# Patient Record
Sex: MALE | Race: WHITE | NOT HISPANIC OR LATINO | Employment: FULL TIME | URBAN - METROPOLITAN AREA
[De-identification: names, ages, dates, MRNs, and addresses within clinical notes are randomized per-mention and may not be internally consistent; named-entity substitution may affect disease eponyms.]

---

## 2019-06-09 ENCOUNTER — OFFICE VISIT (OUTPATIENT)
Dept: RETAIL CLINIC | Facility: CLINIC | Age: 54
End: 2019-06-09

## 2019-06-09 VITALS
BODY MASS INDEX: 22.88 KG/M2 | SYSTOLIC BLOOD PRESSURE: 126 MMHG | TEMPERATURE: 97.9 F | WEIGHT: 134 LBS | DIASTOLIC BLOOD PRESSURE: 78 MMHG | OXYGEN SATURATION: 98 % | HEART RATE: 78 BPM | HEIGHT: 64 IN | RESPIRATION RATE: 18 BRPM

## 2019-06-09 DIAGNOSIS — J44.1 COPD WITH ACUTE EXACERBATION (HCC): Primary | ICD-10-CM

## 2019-06-09 DIAGNOSIS — R05.9 COUGHING: ICD-10-CM

## 2019-06-09 DIAGNOSIS — R06.2 WHEEZING ON EXHALATION: ICD-10-CM

## 2019-06-09 PROCEDURE — 94640 AIRWAY INHALATION TREATMENT: CPT | Performed by: NURSE PRACTITIONER

## 2019-06-09 PROCEDURE — 99213 OFFICE O/P EST LOW 20 MIN: CPT | Performed by: NURSE PRACTITIONER

## 2019-06-09 RX ORDER — PREDNISONE 10 MG/1
TABLET ORAL
Qty: 19 TABLET | Refills: 0 | Status: SHIPPED | OUTPATIENT
Start: 2019-06-09 | End: 2021-04-06

## 2019-06-09 RX ORDER — ALBUTEROL SULFATE 90 UG/1
2 AEROSOL, METERED RESPIRATORY (INHALATION) EVERY 4 HOURS PRN
COMMUNITY
End: 2019-06-09

## 2019-06-09 RX ORDER — AZITHROMYCIN 250 MG/1
TABLET, FILM COATED ORAL
Qty: 6 TABLET | Refills: 0 | Status: SHIPPED | OUTPATIENT
Start: 2019-06-09 | End: 2021-04-06

## 2019-06-09 RX ORDER — BENZONATATE 100 MG/1
100 CAPSULE ORAL 3 TIMES DAILY PRN
Qty: 30 CAPSULE | Refills: 0 | Status: SHIPPED | OUTPATIENT
Start: 2019-06-09 | End: 2019-06-19

## 2019-06-09 RX ORDER — BUDESONIDE AND FORMOTEROL FUMARATE DIHYDRATE 160; 4.5 UG/1; UG/1
2 AEROSOL RESPIRATORY (INHALATION)
Qty: 1 INHALER | Refills: 1 | Status: SHIPPED | OUTPATIENT
Start: 2019-06-09 | End: 2021-04-06

## 2019-06-09 RX ORDER — ALBUTEROL SULFATE 0.63 MG/3ML
1 SOLUTION RESPIRATORY (INHALATION) EVERY 6 HOURS PRN
Qty: 30 AMPULE | Refills: 12 | Status: SHIPPED | OUTPATIENT
Start: 2019-06-09 | End: 2021-11-23

## 2019-06-09 RX ORDER — IPRATROPIUM BROMIDE AND ALBUTEROL SULFATE 2.5; .5 MG/3ML; MG/3ML
3 SOLUTION RESPIRATORY (INHALATION) AS NEEDED
Status: COMPLETED | OUTPATIENT
Start: 2019-06-09 | End: 2019-06-09

## 2019-06-09 RX ADMIN — IPRATROPIUM BROMIDE AND ALBUTEROL SULFATE 3 ML: 2.5; .5 SOLUTION RESPIRATORY (INHALATION) at 14:04

## 2019-06-09 NOTE — PROGRESS NOTES
"Subjective   Ace Lane is a 54 y.o. male.     /78 (BP Location: Left arm, Patient Position: Sitting, Cuff Size: Adult)   Pulse 78   Temp 97.9 °F (36.6 °C) (Oral)   Resp 18   Ht 162.6 cm (64\")   Wt 60.8 kg (134 lb)   SpO2 98%   BMI 23.00 kg/m²       Cough   This is a new problem. The current episode started 1 to 4 weeks ago. The problem has been gradually worsening. The problem occurs constantly. The cough is productive of purulent sputum. Associated symptoms include chills, nasal congestion, postnasal drip, shortness of breath and wheezing. Nothing aggravates the symptoms. He has tried a beta-agonist inhaler and OTC cough suppressant for the symptoms. The treatment provided no relief. His past medical history is significant for COPD, environmental allergies and pneumonia. hx of PNA around 4 years, pt goes to [facility]         The following portions of the patient's history were reviewed and updated as appropriate: current medications, past family history, past medical history, past social history, past surgical history and problem list.    Review of Systems   Constitutional: Positive for chills and fatigue.   HENT: Positive for postnasal drip.    Eyes: Negative.    Respiratory: Positive for cough, shortness of breath and wheezing.    Gastrointestinal: Negative.    Genitourinary: Negative.    Musculoskeletal: Negative.    Skin: Negative.    Allergic/Immunologic: Positive for environmental allergies.   Hematological: Negative.    Psychiatric/Behavioral: Negative.        Objective   Physical Exam   Constitutional: He is oriented to person, place, and time. He appears well-developed and well-nourished.   HENT:   Head: Normocephalic and atraumatic.   Right Ear: External ear normal.   Left Ear: External ear normal.   Nose: Sinus tenderness and congestion present.   Mouth/Throat: Uvula is midline. Posterior oropharyngeal erythema present.   Eyes: Conjunctivae and EOM are normal. Pupils are equal, " round, and reactive to light.   Neck: Normal range of motion.   Cardiovascular: Normal rate, regular rhythm and normal heart sounds.   Pulmonary/Chest: Effort normal. He has wheezes in the right upper field, the right middle field, the right lower field, the left upper field, the left middle field and the left lower field.   Abdominal: Soft. Bowel sounds are normal.   Musculoskeletal: Normal range of motion.   Neurological: He is alert and oriented to person, place, and time.   Skin: Skin is warm and dry. Capillary refill takes less than 2 seconds.   Psychiatric: He has a normal mood and affect.   Vitals reviewed.        Assessment/Plan   Ace was seen today for cough.    Diagnoses and all orders for this visit:    COPD with acute exacerbation (CMS/[facility])  -     albuterol (PROAIR RESPICLICK) 108 (90 Base) MCG/ACT inhaler; Inhale 2 puffs Every 4 (Four) Hours As Needed for Wheezing.  -     budesonide-formoterol (SYMBICORT) 160-4.5 MCG/ACT inhaler; Inhale 2 puffs 2 (Two) Times a Day.  -     azithromycin (ZITHROMAX) 250 MG tablet; Take 2 tablets the first day, then 1 tablet daily for 4 days.    Wheezing on exhalation  -     Nebulizer Treatment  -     ipratropium-albuterol (DUO-NEB) nebulizer solution 3 mL  -     predniSONE (DELTASONE) 10 MG tablet; Take 4 tabs PO x 2 days, then take 3 tabs PO x 2 days, 2 tabs PO x 2 day, then take 1 tab PO x 1 day    Coughing  -     benzonatate (TESSALON PERLES) 100 MG capsule; Take 1 capsule by mouth 3 (Three) Times a Day As Needed for Cough for up to 10 days.    Other orders  -     albuterol (ACCUNEB) 0.63 MG/3ML nebulizer solution; Take 3 mL by nebulization Every 6 (Six) Hours As Needed for Wheezing.          Chronic Obstructive Pulmonary Disease  Chronic obstructive pulmonary disease (COPD) is a long-term (chronic) lung problem. When you have COPD, it is hard for air to get in and out of your lungs. Usually the condition gets worse over time, and your lungs will never return to  normal. There are things you can do to keep yourself as healthy as possible.  · Your doctor may treat your condition with:  ? Medicines.  ? Oxygen.  ? Lung surgery.  · Your doctor may also recommend:  ? Rehabilitation. This includes steps to make your body work better. It may involve a team of specialists.  ? Quitting smoking, if you smoke.  ? Exercise and changes to your diet.  ? Comfort measures (palliative care).    Follow these instructions at home:  Medicines  · Take over-the-counter and prescription medicines only as told by your doctor.  · Talk to your doctor before taking any cough or allergy medicines. You may need to avoid medicines that cause your lungs to be dry.  Lifestyle  · If you smoke, stop. Smoking makes the problem worse. If you need help quitting, ask your doctor.  · Avoid being around things that make your breathing worse. This may include smoke, chemicals, and fumes.  · Stay active, but remember to rest as well.  · Learn and use tips on how to relax.  · Make sure you get enough sleep. Most adults need at least 7 hours of sleep every night.  · Eat healthy foods. Eat smaller meals more often. Rest before meals.  Controlled breathing  Learn and use tips on how to control your breathing as told by your doctor. Try:  · Breathing in (inhaling) through your nose for 1 second. Then, pucker your lips and breath out (exhale) through your lips for 2 seconds.  · Putting one hand on your belly (abdomen). Breathe in slowly through your nose for 1 second. Your hand on your belly should move out. Pucker your lips and breathe out slowly through your lips. Your hand on your belly should move in as you breathe out.    Controlled coughing  Learn and use controlled coughing to clear mucus from your lungs. Follow these steps:  1. Lean your head a little forward.  2. Breathe in deeply.  3. Try to hold your breath for 3 seconds.  4. Keep your mouth slightly open while coughing 2 times.  5. Spit any mucus out into a  tissue.  6. Rest and do the steps again 1 or 2 times as needed.    General instructions  · Make sure you get all the shots (vaccines) that your doctor recommends. Ask your doctor about a flu shot and a pneumonia shot.  · Use oxygen therapy and pulmonary rehabilitation if told by your doctor. If you need home oxygen therapy, ask your doctor if you should buy a tool to measure your oxygen level (oximeter).  · Make a COPD action plan with your doctor. This helps you to know what to do if you feel worse than usual.  · Manage any other conditions you have as told by your doctor.  · Avoid going outside when it is very hot, cold, or humid.  · Avoid people who have a sickness you can catch (contagious).  · Keep all follow-up visits as told by your doctor. This is important.  Contact a doctor if:  · You cough up more mucus than usual.  · There is a change in the color or thickness of the mucus.  · It is harder to breathe than usual.  · Your breathing is faster than usual.  · You have trouble sleeping.  · You need to use your medicines more often than usual.  · You have trouble doing your normal activities such as getting dressed or walking around the house.  Get help right away if:  · You have shortness of breath while resting.  · You have shortness of breath that stops you from:  ? Being able to talk.  ? Doing normal activities.  · Your chest hurts for longer than 5 minutes.  · Your skin color is more blue than usual.  · Your pulse oximeter shows that you have low oxygen for longer than 5 minutes.  · You have a fever.  · You feel too tired to breathe normally.  Summary  · Chronic obstructive pulmonary disease (COPD) is a long-term lung problem.  · The way your lungs work will never return to normal. Usually the condition gets worse over time. There are things you can do to keep yourself as healthy as possible.  · Take over-the-counter and prescription medicines only as told by your doctor.  · If you smoke, stop. Smoking  makes the problem worse.  This information is not intended to replace advice given to you by your health care provider. Make sure you discuss any questions you have with your health care provider.  Document Released: 06/05/2009 Document Revised: 01/22/2018 Document Reviewed: 01/22/2018  Elsevier Interactive Patient Education © 2019 [employer]

## 2021-04-06 ENCOUNTER — OFFICE VISIT (OUTPATIENT)
Dept: SURGERY | Facility: CLINIC | Age: 56
End: 2021-04-06

## 2021-04-06 ENCOUNTER — LAB (OUTPATIENT)
Dept: LAB | Facility: HOSPITAL | Age: 56
End: 2021-04-06

## 2021-04-06 VITALS
HEIGHT: 64 IN | BODY MASS INDEX: 23.9 KG/M2 | WEIGHT: 140 LBS | DIASTOLIC BLOOD PRESSURE: 54 MMHG | SYSTOLIC BLOOD PRESSURE: 120 MMHG

## 2021-04-06 DIAGNOSIS — R10.11 RIGHT UPPER QUADRANT ABDOMINAL PAIN: ICD-10-CM

## 2021-04-06 DIAGNOSIS — R10.11 RIGHT UPPER QUADRANT ABDOMINAL PAIN: Primary | ICD-10-CM

## 2021-04-06 LAB
ALBUMIN SERPL-MCNC: 4.1 G/DL (ref 3.5–5.2)
ALBUMIN/GLOB SERPL: 1.7 G/DL
ALP SERPL-CCNC: 52 U/L (ref 39–117)
ALT SERPL W P-5'-P-CCNC: 96 U/L (ref 1–41)
ANION GAP SERPL CALCULATED.3IONS-SCNC: 11.3 MMOL/L (ref 5–15)
AST SERPL-CCNC: 77 U/L (ref 1–40)
BILIRUB SERPL-MCNC: 0.5 MG/DL (ref 0–1.2)
BUN SERPL-MCNC: 5 MG/DL (ref 6–20)
BUN/CREAT SERPL: 6.9 (ref 7–25)
CALCIUM SPEC-SCNC: 9.4 MG/DL (ref 8.6–10.5)
CHLORIDE SERPL-SCNC: 97 MMOL/L (ref 98–107)
CO2 SERPL-SCNC: 26.7 MMOL/L (ref 22–29)
CREAT SERPL-MCNC: 0.72 MG/DL (ref 0.76–1.27)
DEPRECATED RDW RBC AUTO: 43.9 FL (ref 37–54)
ERYTHROCYTE [DISTWIDTH] IN BLOOD BY AUTOMATED COUNT: 12.6 % (ref 12.3–15.4)
GFR SERPL CREATININE-BSD FRML MDRD: 113 ML/MIN/1.73
GLOBULIN UR ELPH-MCNC: 2.4 GM/DL
GLUCOSE SERPL-MCNC: 97 MG/DL (ref 65–99)
HCT VFR BLD AUTO: 46 % (ref 37.5–51)
HGB BLD-MCNC: 15.9 G/DL (ref 13–17.7)
MCH RBC QN AUTO: 32.9 PG (ref 26.6–33)
MCHC RBC AUTO-ENTMCNC: 34.6 G/DL (ref 31.5–35.7)
MCV RBC AUTO: 95.2 FL (ref 79–97)
PLATELET # BLD AUTO: 186 10*3/MM3 (ref 140–450)
PMV BLD AUTO: 9.6 FL (ref 6–12)
POTASSIUM SERPL-SCNC: 4.3 MMOL/L (ref 3.5–5.2)
PROT SERPL-MCNC: 6.5 G/DL (ref 6–8.5)
RBC # BLD AUTO: 4.83 10*6/MM3 (ref 4.14–5.8)
SODIUM SERPL-SCNC: 135 MMOL/L (ref 136–145)
WBC # BLD AUTO: 6.33 10*3/MM3 (ref 3.4–10.8)

## 2021-04-06 PROCEDURE — 99203 OFFICE O/P NEW LOW 30 MIN: CPT | Performed by: SURGERY

## 2021-04-06 PROCEDURE — 80053 COMPREHEN METABOLIC PANEL: CPT

## 2021-04-06 PROCEDURE — 85027 COMPLETE CBC AUTOMATED: CPT

## 2021-04-06 PROCEDURE — 36415 COLL VENOUS BLD VENIPUNCTURE: CPT

## 2021-04-06 RX ORDER — CETIRIZINE HCL 10 MG/1
1 CAPSULE ORAL EVERY OTHER DAY
COMMUNITY
Start: 2014-12-17

## 2021-04-06 NOTE — PROGRESS NOTES
PATIENT INFORMATION  Ace Lane       - 1965    CHIEF COMPLAINT  Chief Complaint   Patient presents with   • Abdominal Pain   no testing done    HISTORY OF PRESENT ILLNESS  HPI complains of several week history of right upper quadrant pain after he eats anything.  This is associated with nausea and he says he vomits every morning.  He denies any jaundice type symptoms denies any fevers or chills.  He has not had any recent imaging.  He denies any postural type symptoms.  He has not had a recent colonoscopy.  He says his stool has been darker than normal but not black.  He did have a CAT scan in 2016 but no imaging since        REVIEW OF SYSTEMS  Review of Systems he is a smoker.  All other organ systems reviewed and are negative specifically denies any urinary tract complaints      ACTIVE PROBLEMS  There are no problems to display for this patient.        PAST MEDICAL HISTORY  Past Medical History:   Diagnosis Date   • COPD (chronic obstructive pulmonary disease) (CMS/HCC)    • Hypertension          SURGICAL HISTORY  Past Surgical History:   Procedure Laterality Date   • FACIAL COSMETIC SURGERY           FAMILY HISTORY  No family history on file.      SOCIAL HISTORY  Social History     Occupational History   • Not on file   Tobacco Use   • Smoking status: Current Every Day Smoker     Packs/day: 1.00     Types: Cigarettes   Substance and Sexual Activity   • Alcohol use: Yes     Comment: 2-3 cans per day   • Drug use: No   • Sexual activity: Not on file         CURRENT MEDICATIONS    Current Outpatient Medications:   •  albuterol (ACCUNEB) 0.63 MG/3ML nebulizer solution, Take 3 mL by nebulization Every 6 (Six) Hours As Needed for Wheezing., Disp: 30 ampule, Rfl: 12  •  albuterol (PROAIR RESPICLICK) 108 (90 Base) MCG/ACT inhaler, Inhale 2 puffs Every 4 (Four) Hours As Needed for Wheezing., Disp: 1 inhaler, Rfl: 1  •  Cetirizine HCl (Allergy Relief) 10 MG capsule, Take  by mouth., Disp: , Rfl:   •   "azithromycin (ZITHROMAX) 250 MG tablet, Take 2 tablets the first day, then 1 tablet daily for 4 days., Disp: 6 tablet, Rfl: 0  •  budesonide-formoterol (SYMBICORT) 160-4.5 MCG/ACT inhaler, Inhale 2 puffs 2 (Two) Times a Day., Disp: 1 inhaler, Rfl: 1  •  dicyclomine (BENTYL) 20 MG tablet, Take 1 tablet by mouth Every 8 (Eight) Hours As Needed (abdominal pain)., Disp: 20 tablet, Rfl: 0  •  predniSONE (DELTASONE) 10 MG tablet, Take 4 tabs PO x 2 days, then take 3 tabs PO x 2 days, 2 tabs PO x 2 day, then take 1 tab PO x 1 day, Disp: 19 tablet, Rfl: 0    ALLERGIES  Codeine    VITALS  Vitals:    04/06/21 1040   BP: 120/54   Weight: 63.5 kg (140 lb)   Height: 162.6 cm (64\")       LAST RESULTS   Admission on 12/28/2016, Discharged on 12/28/2016   Component Date Value Ref Range Status   • Glucose 12/28/2016 86  65 - 99 mg/dL Final   • BUN 12/28/2016 10  6 - 20 mg/dL Final   • Creatinine 12/28/2016 0.72* 0.76 - 1.27 mg/dL Final   • Sodium 12/28/2016 135* 136 - 145 mmol/L Final   • Potassium 12/28/2016 4.5  3.5 - 5.2 mmol/L Final   • Chloride 12/28/2016 97* 98 - 107 mmol/L Final   • CO2 12/28/2016 24.3  22.0 - 29.0 mmol/L Final   • Calcium 12/28/2016 9.1  8.6 - 10.5 mg/dL Final   • Total Protein 12/28/2016 6.8  6.0 - 8.5 g/dL Final   • Albumin 12/28/2016 4.10  3.50 - 5.20 g/dL Final   • ALT (SGPT) 12/28/2016 15  5 - 41 U/L Final   • AST (SGOT) 12/28/2016 19  5 - 40 U/L Final   • Alkaline Phosphatase 12/28/2016 62  40 - 129 U/L Final   • Total Bilirubin 12/28/2016 0.6  0.2 - 1.2 mg/dL Final   • eGFR Non African Amer 12/28/2016 116  >60 mL/min/1.73 Final   • Globulin 12/28/2016 2.7  gm/dL Final   • A/G Ratio 12/28/2016 1.5  g/dL Final   • BUN/Creatinine Ratio 12/28/2016 13.9  7.0 - 25.0 Final   • Anion Gap 12/28/2016 13.7  mmol/L Final   • Lipase 12/28/2016 23  13 - 60 U/L Final   • Color, UA 12/28/2016 Yellow  Yellow, Straw Final   • Appearance, UA 12/28/2016 Clear  Clear Final   • pH, UA 12/28/2016 7.0  4.5 - 8.0 Final   • " Specific Gravity, UA 12/28/2016 1.010  1.003 - 1.030 Final   • Glucose, UA 12/28/2016 Negative  Negative Final   • Ketones, UA 12/28/2016 Negative  Negative Final   • Bilirubin, UA 12/28/2016 Negative  Negative Final   • Blood, UA 12/28/2016 Negative  Negative Final   • Protein, UA 12/28/2016 Negative  Negative Final   • Leuk Esterase, UA 12/28/2016 Negative  Negative Final   • Nitrite, UA 12/28/2016 Negative  Negative Final   • Urobilinogen, UA 12/28/2016 0.2 E.U./dL  0.2 - 1.0 E.U./dL Final   • Extra Tube 12/28/2016 hold for add-on   Final    Auto resulted   • Extra Tube 12/28/2016 Hold for add-ons.   Final    Auto resulted.   • Extra Tube 12/28/2016 hold for add-on   Final    Auto resulted   • Extra Tube 12/28/2016 Hold for add-ons.   Final    Auto resulted.   • WBC 12/28/2016 7.57  4.80 - 10.80 10*3/mm3 Final   • RBC 12/28/2016 5.15  4.70 - 6.10 10*6/mm3 Final   • Hemoglobin 12/28/2016 16.1  14.0 - 18.0 g/dL Final   • Hematocrit 12/28/2016 47.5  42.0 - 52.0 % Final   • MCV 12/28/2016 92.2  80.0 - 94.0 fL Final   • MCH 12/28/2016 31.3* 27.0 - 31.0 pg Final   • MCHC 12/28/2016 33.9  31.0 - 37.0 g/dL Final   • RDW 12/28/2016 12.8  11.5 - 14.5 % Final   • RDW-SD 12/28/2016 43.6  37.0 - 54.0 fl Final   • MPV 12/28/2016 9.2  7.4 - 10.4 fL Final   • Platelets 12/28/2016 200  140 - 500 10*3/mm3 Final   • Neutrophil % 12/28/2016 55.8  45.0 - 70.0 % Final   • Lymphocyte % 12/28/2016 31.4  20.0 - 45.0 % Final   • Monocyte % 12/28/2016 7.0  3.0 - 8.0 % Final   • Eosinophil % 12/28/2016 4.5* 0.0 - 4.0 % Final   • Basophil % 12/28/2016 0.8  0.0 - 2.0 % Final   • Immature Grans % 12/28/2016 0.5  0.0 - 0.5 % Final   • Neutrophils, Absolute 12/28/2016 4.22  1.50 - 8.30 10*3/mm3 Final   • Lymphocytes, Absolute 12/28/2016 2.38  0.60 - 4.80 10*3/mm3 Final   • Monocytes, Absolute 12/28/2016 0.53  0.00 - 1.00 10*3/mm3 Final   • Eosinophils, Absolute 12/28/2016 0.34* 0.10 - 0.30 10*3/mm3 Final   • Basophils, Absolute 12/28/2016 0.06   0.00 - 0.20 10*3/mm3 Final   • Immature Grans, Absolute 12/28/2016 0.04* 0.00 - 0.03 10*3/mm3 Final   • nRBC 12/28/2016 0.0  0.0 - 0.0 /100 WBC Final     No results found.    PHYSICAL EXAM  Debilities/Disabilities Identified: None  Emotional Behavior: Appropriate  Physical Exam alert white male in no active distress.  He does not have any clinical jaundice.  His lungs are clear and equal.  His abdomen NABS soft nontender without mass.  CT scan from 2016 showed a distended gallbladder but no stones.  ASSESSMENT  Right upper quadrant abdominal pain      PLAN  I will see him back after an ultrasound of his gallbladder, CBC, CMP are performed

## 2021-04-14 ENCOUNTER — HOSPITAL ENCOUNTER (OUTPATIENT)
Dept: ULTRASOUND IMAGING | Facility: HOSPITAL | Age: 56
Discharge: HOME | End: 2021-04-14
Admitting: SURGERY

## 2021-04-14 DIAGNOSIS — R10.11 RIGHT UPPER QUADRANT ABDOMINAL PAIN: Primary | ICD-10-CM

## 2021-04-14 DIAGNOSIS — R10.11 RIGHT UPPER QUADRANT ABDOMINAL PAIN: ICD-10-CM

## 2021-04-14 PROCEDURE — 76705 ECHO EXAM OF ABDOMEN: CPT

## 2021-04-15 ENCOUNTER — TELEPHONE (OUTPATIENT)
Dept: GASTROENTEROLOGY | Facility: CLINIC | Age: 56
End: 2021-04-15

## 2021-04-15 NOTE — TELEPHONE ENCOUNTER
Patient called today wanting to know if the provider wanted to see him before or after the scan he is getting on Monday 04/19/2012     NM HIDA Scan With Pharmacological Intervention is the appt .     Patients wife is the contact phone number     Kamla Ariana (Spouse)       [phone]

## 2021-04-19 ENCOUNTER — DOCUMENTATION (OUTPATIENT)
Dept: SURGERY | Facility: CLINIC | Age: 56
End: 2021-04-19

## 2021-04-19 ENCOUNTER — HOSPITAL ENCOUNTER (OUTPATIENT)
Dept: NUCLEAR MEDICINE | Facility: HOSPITAL | Age: 56
Discharge: HOME | End: 2021-04-19

## 2021-04-19 DIAGNOSIS — R10.11 RIGHT UPPER QUADRANT ABDOMINAL PAIN: ICD-10-CM

## 2021-04-19 PROCEDURE — A9537 TC99M MEBROFENIN: HCPCS | Performed by: SURGERY

## 2021-04-19 PROCEDURE — 0 TECHNETIUM TC 99M MEBROFENIN KIT: Performed by: SURGERY

## 2021-04-19 PROCEDURE — 78226 HEPATOBILIARY SYSTEM IMAGING: CPT

## 2021-04-19 RX ORDER — KIT FOR THE PREPARATION OF TECHNETIUM TC 99M MEBROFENIN 45 MG/10ML
1 INJECTION, POWDER, LYOPHILIZED, FOR SOLUTION INTRAVENOUS
Status: COMPLETED | OUTPATIENT
Start: 2021-04-19 | End: 2021-04-19

## 2021-04-19 RX ADMIN — MEBROFENIN 1 DOSE: 45 INJECTION, POWDER, LYOPHILIZED, FOR SOLUTION INTRAVENOUS at 13:04

## 2021-04-19 NOTE — PROGRESS NOTES
Ultrasound and fatty food stimulation HIDA scan are both normal.  My staff will call the patient and we would recommend he see gastroenterology for an EGD

## 2021-04-19 NOTE — PROGRESS NOTES
Patient aware of results. He is going to let his wife schedule his appointment with a gastroenterologist.

## 2021-05-19 ENCOUNTER — OFFICE VISIT (OUTPATIENT)
Dept: GASTROENTEROLOGY | Facility: CLINIC | Age: 56
End: 2021-05-19

## 2021-05-19 ENCOUNTER — PREP FOR SURGERY (OUTPATIENT)
Dept: SURGERY | Facility: SURGERY CENTER | Age: 56
End: 2021-05-19

## 2021-05-19 ENCOUNTER — TRANSCRIBE ORDERS (OUTPATIENT)
Dept: LAB | Facility: SURGERY CENTER | Age: 56
End: 2021-05-19

## 2021-05-19 VITALS
WEIGHT: 134.5 LBS | BODY MASS INDEX: 22.96 KG/M2 | DIASTOLIC BLOOD PRESSURE: 84 MMHG | SYSTOLIC BLOOD PRESSURE: 138 MMHG | TEMPERATURE: 98.4 F | OXYGEN SATURATION: 98 % | HEIGHT: 64 IN | HEART RATE: 64 BPM | RESPIRATION RATE: 16 BRPM

## 2021-05-19 DIAGNOSIS — R10.11 RIGHT UPPER QUADRANT ABDOMINAL PAIN: Primary | ICD-10-CM

## 2021-05-19 DIAGNOSIS — R74.8 ELEVATED LIVER ENZYMES: ICD-10-CM

## 2021-05-19 DIAGNOSIS — R11.0 NAUSEA: ICD-10-CM

## 2021-05-19 DIAGNOSIS — R63.4 WEIGHT LOSS: ICD-10-CM

## 2021-05-19 DIAGNOSIS — Z01.818 OTHER SPECIFIED PRE-OPERATIVE EXAMINATION: Primary | ICD-10-CM

## 2021-05-19 DIAGNOSIS — Z12.11 COLON CANCER SCREENING: ICD-10-CM

## 2021-05-19 PROCEDURE — 99204 OFFICE O/P NEW MOD 45 MIN: CPT | Performed by: INTERNAL MEDICINE

## 2021-05-19 RX ORDER — SODIUM CHLORIDE 0.9 % (FLUSH) 0.9 %
3 SYRINGE (ML) INJECTION EVERY 12 HOURS SCHEDULED
Status: CANCELLED | OUTPATIENT
Start: 2021-05-19

## 2021-05-19 RX ORDER — SODIUM CHLORIDE 0.9 % (FLUSH) 0.9 %
10 SYRINGE (ML) INJECTION AS NEEDED
Status: CANCELLED | OUTPATIENT
Start: 2021-05-19

## 2021-05-19 RX ORDER — SODIUM CHLORIDE, SODIUM LACTATE, POTASSIUM CHLORIDE, CALCIUM CHLORIDE 600; 310; 30; 20 MG/100ML; MG/100ML; MG/100ML; MG/100ML
30 INJECTION, SOLUTION INTRAVENOUS CONTINUOUS PRN
Status: CANCELLED | OUTPATIENT
Start: 2021-05-19

## 2021-05-19 NOTE — PROGRESS NOTES
"Chief Complaint   Patient presents with   • Abdominal Pain   • Elevated Hepatic Enzymes   • Colon Cancer Screening         History of Present Illness  Patient here for nausea, weight loss and right upper quadrant pain. He reports epigastric pain that he describes as a \"hot poker\" after eating. Pain does not radiate.  If he does not eat, he will not have any pain. Gallbladder work up was normal. He reports accompanying nausea with the abdominal pain. He denies any history of pancreatitis. He reports a history of heavy alcohol use, but not recently. He reports a 16 lb weight loss due to lack of eating. He is sam if he eats once per day.     He reports having a colonoscopy many years ago.        Result Review :       NM Hepatobiliary Without CCK (04/19/2021 15:10)  US Gallbladder (04/14/2021 10:19)  CT Abdomen Pelvis With Contrast (12/28/2016 13:33)      Vital Signs:   /84   Pulse 64   Temp 98.4 °F (36.9 °C) (Temporal)   Resp 16   Ht 162.6 cm (64\")   Wt 61 kg (134 lb 8 oz)   SpO2 98%   BMI 23.09 kg/m²     Body mass index is 23.09 kg/m².     Physical Exam  Vitals reviewed.   Constitutional:       Appearance: Normal appearance.   Abdominal:      General: Bowel sounds are normal. There is no distension.      Palpations: Abdomen is soft. Abdomen is not rigid. There is no mass or pulsatile mass.      Tenderness: There is no abdominal tenderness. There is no guarding or rebound.       Assessment and Plan    Diagnoses and all orders for this visit:    1. Right upper quadrant abdominal pain (Primary)    2. Weight loss    3. Nausea    4. Elevated liver enzymes    5. Colon cancer screening    Other orders  -     CT Abdomen Pelvis With Contrast; Future  -     Alpha - 1 - Antitrypsin  -     ANTONIO  -     Anti-Smooth Muscle Antibody Titer  -     CBC & Differential  -     Comprehensive Metabolic Panel  -     Ferritin  -     Celiac Disease Panel  -     Ceruloplasmin  -     Gamma GT  -     Hepatitis Panel, Acute  -     " IgG, IgA, IgM  -     Iron Profile  -     Mitochondrial Antibodies, M2         BRIEF SUMMARY  Patient for consultation with multiple issues.  He has elevated liver enzymes which is new.  We will check a full serologic and metabolic panel to assess this.  Liver ultrasound and HIDA scan were negative.  He also has new epigastric pain after meals.  He has a long history of alcohol though none currently and also is a smoker and has new and significant weight loss so we will proceed with a CT to rule out any type of pancreatic issue.  Also proceed with an EGD for further evaluation of epigastric pain.  We also going to get his screening colonoscopy scheduled.    I have reviewed and confirmed the accuracy of the HPI and Assessment and Plan as documented by the APRN JOSH Ramirez        Follow Up   No follow-ups on file.    Patient Instructions   1. For further evaluation of epigastric pain and weight loss we will schedule an EGD.    2. For weight loss and colon cancer screening we will schedule a colonoscopy.    3. For further evaluation of abdominal pain we will schedule a CT scan of the abdomen and pelvis.    4. For further evaluation of elevated liver enzymes we will order a full liver lab work up.

## 2021-05-19 NOTE — PATIENT INSTRUCTIONS
1. For further evaluation of epigastric pain and weight loss we will schedule an EGD.    2. For weight loss and colon cancer screening we will schedule a colonoscopy.    3. For further evaluation of abdominal pain we will schedule a CT scan of the abdomen and pelvis.    4. For further evaluation of elevated liver enzymes we will order a full liver lab work up.

## 2021-05-20 LAB
A1AT SERPL-MCNC: 144 MG/DL (ref 101–187)
ACTIN IGG SERPL-ACNC: 4 UNITS (ref 0–19)
ALBUMIN SERPL-MCNC: 4.7 G/DL (ref 3.5–5.2)
ALBUMIN/GLOB SERPL: 2.8 G/DL
ALP SERPL-CCNC: 49 U/L (ref 39–117)
ALT SERPL-CCNC: 46 U/L (ref 1–41)
ANA SER QL: POSITIVE
AST SERPL-CCNC: 33 U/L (ref 1–40)
BASOPHILS # BLD AUTO: 0.06 10*3/MM3 (ref 0–0.2)
BASOPHILS NFR BLD AUTO: 0.9 % (ref 0–1.5)
BILIRUB SERPL-MCNC: 0.7 MG/DL (ref 0–1.2)
BUN SERPL-MCNC: 8 MG/DL (ref 6–20)
BUN/CREAT SERPL: 10.7 (ref 7–25)
CALCIUM SERPL-MCNC: 9.5 MG/DL (ref 8.6–10.5)
CERULOPLASMIN SERPL-MCNC: 17 MG/DL (ref 16–31)
CHLORIDE SERPL-SCNC: 98 MMOL/L (ref 98–107)
CO2 SERPL-SCNC: 25.8 MMOL/L (ref 22–29)
CREAT SERPL-MCNC: 0.75 MG/DL (ref 0.76–1.27)
DSDNA AB SER-ACNC: <1 IU/ML (ref 0–9)
ENDOMYSIUM IGA SER QL: NEGATIVE
EOSINOPHIL # BLD AUTO: 0.14 10*3/MM3 (ref 0–0.4)
EOSINOPHIL NFR BLD AUTO: 2.2 % (ref 0.3–6.2)
ERYTHROCYTE [DISTWIDTH] IN BLOOD BY AUTOMATED COUNT: 13 % (ref 12.3–15.4)
FERRITIN SERPL-MCNC: 419 NG/ML (ref 30–400)
GGT SERPL-CCNC: 45 U/L (ref 8–61)
GLOBULIN SER CALC-MCNC: 1.7 GM/DL
GLUCOSE SERPL-MCNC: 78 MG/DL (ref 65–99)
HAV IGM SERPL QL IA: NEGATIVE
HBV CORE IGM SERPL QL IA: NEGATIVE
HBV SURFACE AG SERPL QL IA: NEGATIVE
HCT VFR BLD AUTO: 46.7 % (ref 37.5–51)
HCV AB S/CO SERPL IA: <0.1 S/CO RATIO (ref 0–0.9)
HGB BLD-MCNC: 16 G/DL (ref 13–17.7)
IGA SERPL-MCNC: 180 MG/DL (ref 90–386)
IGG SERPL-MCNC: 837 MG/DL (ref 603–1613)
IGM SERPL-MCNC: 57 MG/DL (ref 20–172)
IMM GRANULOCYTES # BLD AUTO: 0.02 10*3/MM3 (ref 0–0.05)
IMM GRANULOCYTES NFR BLD AUTO: 0.3 % (ref 0–0.5)
IRON SATN MFR SERPL: 40 % (ref 20–50)
IRON SERPL-MCNC: 107 MCG/DL (ref 59–158)
LYMPHOCYTES # BLD AUTO: 1.65 10*3/MM3 (ref 0.7–3.1)
LYMPHOCYTES NFR BLD AUTO: 25.5 % (ref 19.6–45.3)
Lab: NORMAL
MCH RBC QN AUTO: 32.5 PG (ref 26.6–33)
MCHC RBC AUTO-ENTMCNC: 34.3 G/DL (ref 31.5–35.7)
MCV RBC AUTO: 94.9 FL (ref 79–97)
MITOCHONDRIA M2 IGG SER-ACNC: <20 UNITS (ref 0–20)
MONOCYTES # BLD AUTO: 0.65 10*3/MM3 (ref 0.1–0.9)
MONOCYTES NFR BLD AUTO: 10.1 % (ref 5–12)
NEUTROPHILS # BLD AUTO: 3.94 10*3/MM3 (ref 1.7–7)
NEUTROPHILS NFR BLD AUTO: 61 % (ref 42.7–76)
NRBC BLD AUTO-RTO: 0 /100 WBC (ref 0–0.2)
PLATELET # BLD AUTO: 215 10*3/MM3 (ref 140–450)
POTASSIUM SERPL-SCNC: 4.5 MMOL/L (ref 3.5–5.2)
PROT SERPL-MCNC: 6.4 G/DL (ref 6–8.5)
RBC # BLD AUTO: 4.92 10*6/MM3 (ref 4.14–5.8)
SODIUM SERPL-SCNC: 135 MMOL/L (ref 136–145)
TIBC SERPL-MCNC: 270 MCG/DL
TTG IGA SER-ACNC: <2 U/ML (ref 0–3)
UIBC SERPL-MCNC: 163 MCG/DL (ref 112–346)
WBC # BLD AUTO: 6.46 10*3/MM3 (ref 3.4–10.8)

## 2021-05-21 ENCOUNTER — LAB (OUTPATIENT)
Dept: LAB | Facility: SURGERY CENTER | Age: 56
End: 2021-05-21

## 2021-05-21 DIAGNOSIS — Z01.818 OTHER SPECIFIED PRE-OPERATIVE EXAMINATION: ICD-10-CM

## 2021-05-21 LAB — SARS-COV-2 ORF1AB RESP QL NAA+PROBE: NOT DETECTED

## 2021-05-21 PROCEDURE — C9803 HOPD COVID-19 SPEC COLLECT: HCPCS

## 2021-05-21 PROCEDURE — U0004 COV-19 TEST NON-CDC HGH THRU: HCPCS | Performed by: SURGERY

## 2021-05-22 NOTE — SIGNIFICANT NOTE
Education provided the Patient on the following:    - Nothing to Eat or Drink after MN the night before the procedure  -Your required COVID Test is Scheduled on          Between the Hours of 3038-6364  -You will only be notified if your COVID test Result is POSITIVE  -The importance of reducing your number of contacts by self quarantining after you COVID test until the date of your Colonoscopy and EGD    - Avoid red/purple fluids while completing their bowel prep as ordered by physician  -Contact Gastrointerologist office for any questions about specific details regarding colon prep    -You will need to have someone drive you home after your colonoscopy and remain with you for 24 hours after the procedure  - The date of your procedure, your ride is welcome to either remain in our parking lot or within 10-15 minutes of [facility]  -Please wear warm socks when you arrive for your procedure  -Remove all jewelry and leave any valuables before arriving the day of your procedure (all will have to be removed before leaving preop)  -You will need to arrive at         10  on      5/24     for your procedure    -Feel free to contact us at: [phone] with any additional questions/concerns         Has the patient ever tested Positive COVID?     If so when?

## 2021-05-24 ENCOUNTER — PREP FOR SURGERY (OUTPATIENT)
Dept: SURGERY | Facility: SURGERY CENTER | Age: 56
End: 2021-05-24

## 2021-05-24 ENCOUNTER — ANESTHESIA (OUTPATIENT)
Dept: SURGERY | Facility: SURGERY CENTER | Age: 56
End: 2021-05-24

## 2021-05-24 ENCOUNTER — HOSPITAL ENCOUNTER (OUTPATIENT)
Facility: SURGERY CENTER | Age: 56
Setting detail: HOSPITAL OUTPATIENT SURGERY
Discharge: HOME | End: 2021-05-24
Attending: INTERNAL MEDICINE | Admitting: INTERNAL MEDICINE

## 2021-05-24 ENCOUNTER — ANESTHESIA EVENT (OUTPATIENT)
Dept: SURGERY | Facility: SURGERY CENTER | Age: 56
End: 2021-05-24

## 2021-05-24 VITALS
RESPIRATION RATE: 16 BRPM | SYSTOLIC BLOOD PRESSURE: 121 MMHG | OXYGEN SATURATION: 96 % | HEART RATE: 89 BPM | BODY MASS INDEX: 22.98 KG/M2 | TEMPERATURE: 97.4 F | WEIGHT: 133.9 LBS | DIASTOLIC BLOOD PRESSURE: 96 MMHG

## 2021-05-24 DIAGNOSIS — Z86.010 PERSONAL HISTORY OF COLONIC POLYPS: Primary | ICD-10-CM

## 2021-05-24 DIAGNOSIS — R63.4 WEIGHT LOSS: ICD-10-CM

## 2021-05-24 DIAGNOSIS — R11.0 NAUSEA: ICD-10-CM

## 2021-05-24 DIAGNOSIS — R10.11 RIGHT UPPER QUADRANT ABDOMINAL PAIN: ICD-10-CM

## 2021-05-24 DIAGNOSIS — Z12.11 COLON CANCER SCREENING: ICD-10-CM

## 2021-05-24 PROCEDURE — 25010000002 PROPOFOL 10 MG/ML EMULSION: Performed by: ANESTHESIOLOGY

## 2021-05-24 PROCEDURE — 45385 COLONOSCOPY W/LESION REMOVAL: CPT | Performed by: INTERNAL MEDICINE

## 2021-05-24 PROCEDURE — 43239 EGD BIOPSY SINGLE/MULTIPLE: CPT | Performed by: INTERNAL MEDICINE

## 2021-05-24 PROCEDURE — 88305 TISSUE EXAM BY PATHOLOGIST: CPT | Performed by: INTERNAL MEDICINE

## 2021-05-24 PROCEDURE — 88312 SPECIAL STAINS GROUP 1: CPT | Performed by: INTERNAL MEDICINE

## 2021-05-24 PROCEDURE — 87081 CULTURE SCREEN ONLY: CPT | Performed by: INTERNAL MEDICINE

## 2021-05-24 RX ORDER — SODIUM CHLORIDE 0.9 % (FLUSH) 0.9 %
3 SYRINGE (ML) INJECTION EVERY 12 HOURS SCHEDULED
Status: DISCONTINUED | OUTPATIENT
Start: 2021-05-24 | End: 2021-05-24 | Stop reason: HOSPADM

## 2021-05-24 RX ORDER — SODIUM CHLORIDE 0.9 % (FLUSH) 0.9 %
10 SYRINGE (ML) INJECTION AS NEEDED
Status: DISCONTINUED | OUTPATIENT
Start: 2021-05-24 | End: 2021-05-24 | Stop reason: HOSPADM

## 2021-05-24 RX ORDER — PROPOFOL 10 MG/ML
VIAL (ML) INTRAVENOUS AS NEEDED
Status: DISCONTINUED | OUTPATIENT
Start: 2021-05-24 | End: 2021-05-24 | Stop reason: SURG

## 2021-05-24 RX ORDER — SODIUM CHLORIDE, SODIUM LACTATE, POTASSIUM CHLORIDE, CALCIUM CHLORIDE 600; 310; 30; 20 MG/100ML; MG/100ML; MG/100ML; MG/100ML
30 INJECTION, SOLUTION INTRAVENOUS CONTINUOUS PRN
Status: CANCELLED | OUTPATIENT
Start: 2021-05-24

## 2021-05-24 RX ORDER — MAGNESIUM HYDROXIDE 1200 MG/15ML
LIQUID ORAL AS NEEDED
Status: DISCONTINUED | OUTPATIENT
Start: 2021-05-24 | End: 2021-05-24 | Stop reason: HOSPADM

## 2021-05-24 RX ORDER — SODIUM CHLORIDE 0.9 % (FLUSH) 0.9 %
10 SYRINGE (ML) INJECTION AS NEEDED
Status: CANCELLED | OUTPATIENT
Start: 2021-05-24

## 2021-05-24 RX ORDER — LIDOCAINE HYDROCHLORIDE 20 MG/ML
INJECTION, SOLUTION INFILTRATION; PERINEURAL AS NEEDED
Status: DISCONTINUED | OUTPATIENT
Start: 2021-05-24 | End: 2021-05-24 | Stop reason: SURG

## 2021-05-24 RX ORDER — SODIUM CHLORIDE 0.9 % (FLUSH) 0.9 %
3 SYRINGE (ML) INJECTION EVERY 12 HOURS SCHEDULED
Status: CANCELLED | OUTPATIENT
Start: 2021-05-24

## 2021-05-24 RX ORDER — SODIUM CHLORIDE, SODIUM LACTATE, POTASSIUM CHLORIDE, CALCIUM CHLORIDE 600; 310; 30; 20 MG/100ML; MG/100ML; MG/100ML; MG/100ML
30 INJECTION, SOLUTION INTRAVENOUS CONTINUOUS PRN
Status: DISCONTINUED | OUTPATIENT
Start: 2021-05-24 | End: 2021-05-24 | Stop reason: HOSPADM

## 2021-05-24 RX ADMIN — PROPOFOL 50 MG: 10 INJECTION, EMULSION INTRAVENOUS at 10:49

## 2021-05-24 RX ADMIN — PROPOFOL 20 MG: 10 INJECTION, EMULSION INTRAVENOUS at 10:52

## 2021-05-24 RX ADMIN — PROPOFOL 120 MG: 10 INJECTION, EMULSION INTRAVENOUS at 10:47

## 2021-05-24 RX ADMIN — PROPOFOL 20 MG: 10 INJECTION, EMULSION INTRAVENOUS at 10:54

## 2021-05-24 RX ADMIN — PROPOFOL 20 MG: 10 INJECTION, EMULSION INTRAVENOUS at 10:51

## 2021-05-24 RX ADMIN — PROPOFOL 50 MG: 10 INJECTION, EMULSION INTRAVENOUS at 10:58

## 2021-05-24 RX ADMIN — SODIUM CHLORIDE, POTASSIUM CHLORIDE, SODIUM LACTATE AND CALCIUM CHLORIDE 30 ML/HR: 600; 310; 30; 20 INJECTION, SOLUTION INTRAVENOUS at 09:38

## 2021-05-24 RX ADMIN — PROPOFOL 20 MG: 10 INJECTION, EMULSION INTRAVENOUS at 10:53

## 2021-05-24 RX ADMIN — LIDOCAINE HYDROCHLORIDE 50 MG: 20 INJECTION, SOLUTION INFILTRATION; PERINEURAL at 10:47

## 2021-05-24 RX ADMIN — PROPOFOL 80 MG: 10 INJECTION, EMULSION INTRAVENOUS at 10:48

## 2021-05-24 RX ADMIN — PROPOFOL INJECTABLE EMULSION 200 MCG/KG/MIN: 10 INJECTION, EMULSION INTRAVENOUS at 10:55

## 2021-05-24 RX ADMIN — PROPOFOL 20 MG: 10 INJECTION, EMULSION INTRAVENOUS at 10:50

## 2021-05-24 NOTE — ANESTHESIA POSTPROCEDURE EVALUATION
Patient: Ace Lane Jr.    Procedure Summary     Date: 05/24/21 Room / Location: [facility]    Anesthesia Start: 1040 Anesthesia Stop: 1126    Procedures:       ESOPHAGOGASTRODUODENOSCOPY WITH BIOPSIES (N/A )      COLONOSCOPY WITH POLYPECTOMIES (N/A ) Diagnosis:       Right upper quadrant abdominal pain      Weight loss      Nausea      Colon cancer screening      (Right upper quadrant abdominal pain [R10.11])      (Weight loss [R63.4])      (Nausea [R11.0])      (Colon cancer screening [Z12.11])    Surgeons: Charles Luevano MD Provider: Koffi Dickson MD    Anesthesia Type: MAC ASA Status: 3          Anesthesia Type: MAC    Vitals  Vitals Value Taken Time   /96 05/24/21 1135   Temp 36.3 °C (97.4 °F) 05/24/21 1125   Pulse 89 05/24/21 1145   Resp 16 05/24/21 1145   SpO2 96 % 05/24/21 1145           Post Anesthesia Care and Evaluation    Patient location during evaluation: bedside  Patient participation: complete - patient participated  Level of consciousness: awake and alert  Pain management: adequate  Airway patency: patent  Anesthetic complications: No anesthetic complications    Cardiovascular status: acceptable  Respiratory status: acceptable  Hydration status: acceptable    Comments: /96   Pulse 89   Temp 36.3 °C (97.4 °F) (Infrared)   Resp 16   Wt 60.7 kg (133 lb 14.4 oz)   SpO2 96%   BMI 22.98 kg/m²

## 2021-05-24 NOTE — ANESTHESIA PREPROCEDURE EVALUATION
Anesthesia Evaluation     Patient summary reviewed   NPO Solid Status: > 8 hours  NPO Liquid Status: > 2 hours           Airway   Mallampati: I  TM distance: >3 FB  Neck ROM: full  Dental    (+) poor dentition    Comment: Several missing    Pulmonary     breath sounds clear to auscultation  (+) a smoker (Quit one week ago) Former, COPD,   (-) shortness of breath  Cardiovascular   Exercise tolerance: good (4-7 METS)    Rhythm: regular  Rate: normal    (+) hypertension,   (-) angina, CARVER      Neuro/Psych  GI/Hepatic/Renal/Endo      Musculoskeletal     Abdominal    Substance History   (+) alcohol use,      OB/GYN          Other                      Anesthesia Plan    ASA 3     MAC   (MAC anesthesia discussed with patient and/or patient representative. Risks (including but not limited to intra-op awareness), benefits, and alternatives were discussed. Understanding was voiced with an agreement to proceed with a MAC technique and General as a backup option.   )  intravenous induction     Anesthetic plan, all risks, benefits, and alternatives have been provided, discussed and informed consent has been obtained with: patient.

## 2021-05-25 LAB — UREASE TISS QL: NEGATIVE

## 2021-05-26 LAB
CYTO UR: NORMAL
LAB AP CASE REPORT: NORMAL
LAB AP CLINICAL INFORMATION: NORMAL
PATH REPORT.FINAL DX SPEC: NORMAL
PATH REPORT.GROSS SPEC: NORMAL

## 2021-05-26 RX ORDER — FLUCONAZOLE 100 MG/1
100 TABLET ORAL DAILY
Qty: 21 TABLET | Refills: 0 | Status: SHIPPED | OUTPATIENT
Start: 2021-05-26 | End: 2021-08-25

## 2021-05-26 NOTE — TELEPHONE ENCOUNTER
Pathology is positive for yeast/Candida infection.   please treat with Diflucan 100 mg two tabs on day one and then 100 mg po daily x 20 days.  Repeat colonoscopy in 6 to 12 months.  Follow-up in the office after her upcoming CT scan.       Please approve medication, thanks

## 2021-06-01 ENCOUNTER — HOSPITAL ENCOUNTER (OUTPATIENT)
Dept: CT IMAGING | Facility: HOSPITAL | Age: 56
Discharge: HOME | End: 2021-06-01
Admitting: INTERNAL MEDICINE

## 2021-06-01 DIAGNOSIS — R11.0 NAUSEA: ICD-10-CM

## 2021-06-01 DIAGNOSIS — R74.8 ELEVATED LIVER ENZYMES: ICD-10-CM

## 2021-06-01 DIAGNOSIS — R63.4 WEIGHT LOSS: ICD-10-CM

## 2021-06-01 PROCEDURE — 0 DIATRIZOATE MEGLUMINE & SODIUM PER 1 ML: Performed by: INTERNAL MEDICINE

## 2021-06-01 PROCEDURE — 74177 CT ABD & PELVIS W/CONTRAST: CPT

## 2021-06-01 PROCEDURE — 0 IOPAMIDOL PER 1 ML: Performed by: INTERNAL MEDICINE

## 2021-06-01 RX ADMIN — DIATRIZOATE MEGLUMINE AND DIATRIZOATE SODIUM 30 ML: 600; 100 SOLUTION ORAL; RECTAL at 07:30

## 2021-06-01 RX ADMIN — IOPAMIDOL 100 ML: 755 INJECTION, SOLUTION INTRAVENOUS at 08:55

## 2021-06-07 NOTE — PROGRESS NOTES
"Chief Complaint   Patient presents with   • Follow-up     GERD, candida esophagitis, elevated lliver enzymes         History of Present Illness  Patient for follow-up.  We had seen him initially for abdominal pain and weight loss.  CT the abdomen was negative.  HIDA scan showed ejection fraction of 96%.  Ultrasound of the right upper quadrant was unremarkable.  EGD showed Candida esophagitis and colonoscopy showed 2 premalignant polyps.  Also has history of elevated liver enzymes but these did not improve with alcohol abstinence.    He is not currently taking any acid suppressive medication. He reports that he has gained 1 lb. He reports eating about 1 meal per day. He reports nausea. He is a current smoker but states that he has cut back.        Result Review :       UPPER GI ENDOSCOPY (05/24/2021 10:42)  COLONOSCOPY (05/24/2021 10:41)  CT Abdomen Pelvis With Contrast (06/01/2021 09:13)  NM Hepatobiliary Without CCK (04/19/2021 15:10)  US Gallbladder (04/14/2021 10:19)      Vital Signs:   /78   Pulse 95   Temp 97.3 °F (36.3 °C)   Resp 16   Ht 162.6 cm (64\")   Wt 61.3 kg (135 lb 3.2 oz)   SpO2 98%   BMI 23.21 kg/m²     Body mass index is 23.21 kg/m².     Physical Exam  Vitals reviewed.   Constitutional:       Appearance: Normal appearance.   Abdominal:      General: Bowel sounds are normal. There is no distension.      Palpations: Abdomen is soft. Abdomen is not rigid. There is no mass or pulsatile mass.      Tenderness: There is no abdominal tenderness. There is no guarding or rebound.       Assessment and Plan    Diagnoses and all orders for this visit:    1. Weight loss (Primary)    2. Adenomatous polyp of colon, unspecified part of colon    3. Candidal esophagitis (CMS/HCC)    4. Elevated liver enzymes    Other orders  -     pantoprazole (PROTONIX) 40 MG EC tablet; Take 1 tablet by mouth Daily.  Dispense: 30 tablet; Refill: 5       Documentation by Jackie MORENO acting as a scribe in the " following sections (HPI, Assessment, Plan) for the undersigned provider.     BRIEF SUMMARY  Patient here for follow-up.  He is doing well.  He has gained some weight.  He will be due for a repeat colonoscopy before the end of the year due to the poor prep.  He completed most of his treatment for Candida esophagitis.  We will start him on pantoprazole due to shallow gastric ulcers.  Also advised him regarding smoking cessation and abstinence from alcohol.  CT was negative for any pancreatic pathology.  We will follow-up after his colonoscopy this fall.    I have reviewed and confirmed the accuracy of the HPI and Assessment and Plan as documented by the APRN JOSH Ramirez        Follow Up   No follow-ups on file.    Patient Instructions   1. For GERD and findings of gastric ulcers on your most recent EGD, we will have you start pantoprazole 40 mg once daily. This prescription has been sent to your pharmacy. We recommend that you take this medication 1 hour before your evening meal.     2. Recommend repeat colonoscopy November 2021. We would recommend a 2 day bowel prep.     3. We recommend smoking cessation.

## 2021-06-09 ENCOUNTER — PREP FOR SURGERY (OUTPATIENT)
Dept: SURGERY | Facility: SURGERY CENTER | Age: 56
End: 2021-06-09

## 2021-06-09 ENCOUNTER — OFFICE VISIT (OUTPATIENT)
Dept: GASTROENTEROLOGY | Facility: CLINIC | Age: 56
End: 2021-06-09

## 2021-06-09 ENCOUNTER — HOSPITAL ENCOUNTER (OUTPATIENT)
Facility: SURGERY CENTER | Age: 56
Setting detail: HOSPITAL OUTPATIENT SURGERY
End: 2021-06-09
Attending: INTERNAL MEDICINE | Admitting: INTERNAL MEDICINE

## 2021-06-09 VITALS
HEART RATE: 95 BPM | SYSTOLIC BLOOD PRESSURE: 132 MMHG | TEMPERATURE: 97.3 F | HEIGHT: 64 IN | BODY MASS INDEX: 23.08 KG/M2 | OXYGEN SATURATION: 98 % | RESPIRATION RATE: 16 BRPM | DIASTOLIC BLOOD PRESSURE: 78 MMHG | WEIGHT: 135.2 LBS

## 2021-06-09 DIAGNOSIS — B37.81 CANDIDAL ESOPHAGITIS (HCC): ICD-10-CM

## 2021-06-09 DIAGNOSIS — Z12.11 COLON CANCER SCREENING: Primary | ICD-10-CM

## 2021-06-09 DIAGNOSIS — D12.6 ADENOMATOUS POLYP OF COLON, UNSPECIFIED PART OF COLON: ICD-10-CM

## 2021-06-09 DIAGNOSIS — R63.4 WEIGHT LOSS: Primary | ICD-10-CM

## 2021-06-09 DIAGNOSIS — R74.8 ELEVATED LIVER ENZYMES: ICD-10-CM

## 2021-06-09 DIAGNOSIS — Z86.010 HX OF COLONIC POLYPS: ICD-10-CM

## 2021-06-09 PROCEDURE — 99213 OFFICE O/P EST LOW 20 MIN: CPT | Performed by: INTERNAL MEDICINE

## 2021-06-09 RX ORDER — SODIUM CHLORIDE 0.9 % (FLUSH) 0.9 %
10 SYRINGE (ML) INJECTION AS NEEDED
Status: CANCELLED | OUTPATIENT
Start: 2021-06-09

## 2021-06-09 RX ORDER — SODIUM CHLORIDE, SODIUM LACTATE, POTASSIUM CHLORIDE, CALCIUM CHLORIDE 600; 310; 30; 20 MG/100ML; MG/100ML; MG/100ML; MG/100ML
30 INJECTION, SOLUTION INTRAVENOUS CONTINUOUS PRN
Status: CANCELLED | OUTPATIENT
Start: 2021-06-09

## 2021-06-09 RX ORDER — SODIUM CHLORIDE 0.9 % (FLUSH) 0.9 %
3 SYRINGE (ML) INJECTION EVERY 12 HOURS SCHEDULED
Status: CANCELLED | OUTPATIENT
Start: 2021-06-09

## 2021-06-09 RX ORDER — PANTOPRAZOLE SODIUM 40 MG/1
40 TABLET, DELAYED RELEASE ORAL DAILY
Qty: 30 TABLET | Refills: 5 | Status: SHIPPED | OUTPATIENT
Start: 2021-06-09 | End: 2021-08-25

## 2021-06-09 NOTE — PATIENT INSTRUCTIONS
1. For GERD and findings of gastric ulcers on your most recent EGD, we will have you start pantoprazole 40 mg once daily. This prescription has been sent to your pharmacy. We recommend that you take this medication 1 hour before your evening meal.     2. Recommend repeat colonoscopy November 2021. We would recommend a 2 day bowel prep.     3. We recommend smoking cessation.

## 2021-12-06 ENCOUNTER — TELEPHONE (OUTPATIENT)
Dept: GASTROENTEROLOGY | Facility: CLINIC | Age: 56
End: 2021-12-06

## 2021-12-06 NOTE — TELEPHONE ENCOUNTER
Patients wife called to reschedule from 12/23 to 2/14 needing a Monday due to transportation and work

## 2022-02-07 RX ORDER — PANTOPRAZOLE SODIUM 40 MG/1
40 TABLET, DELAYED RELEASE ORAL DAILY
Qty: 90 TABLET | Refills: 3 | Status: SHIPPED | OUTPATIENT
Start: 2022-02-07 | End: 2023-02-07

## 2022-02-09 NOTE — SIGNIFICANT NOTE
Education provided the Patient on the following:    - Nothing to Eat or Drink after MN the night before the procedure    - Avoid red/purple fluids while completing their bowel prep as ordered by physician  -Contact Gastrointerologist office for any questions about specific details regarding colon prep    -You will need to have someone drive you home after your colonoscopy and remain with you for 24 hours after the procedure  - The date of your Surgery, you may have one visitor at bedside or within 10-15 minutes of [facility]  -Please wear warm socks when you arrive for your colonoscopy  -Remove all jewelry and leave any valuables before arriving the day of your procedure (all will have to be removed before leaving preop)  -You will need to arrive at 0700 on 2/14/22 for your colonoscopy    -Feel free to contact us at: [phone] with any additional questions/concerns

## 2022-02-14 ENCOUNTER — HOSPITAL ENCOUNTER (OUTPATIENT)
Facility: SURGERY CENTER | Age: 57
Setting detail: HOSPITAL OUTPATIENT SURGERY
Discharge: HOME | End: 2022-02-14
Attending: INTERNAL MEDICINE | Admitting: INTERNAL MEDICINE

## 2022-02-14 ENCOUNTER — ANESTHESIA (OUTPATIENT)
Dept: SURGERY | Facility: SURGERY CENTER | Age: 57
End: 2022-02-14

## 2022-02-14 ENCOUNTER — ANESTHESIA EVENT (OUTPATIENT)
Dept: SURGERY | Facility: SURGERY CENTER | Age: 57
End: 2022-02-14

## 2022-02-14 VITALS
WEIGHT: 143 LBS | SYSTOLIC BLOOD PRESSURE: 104 MMHG | HEART RATE: 83 BPM | BODY MASS INDEX: 24.41 KG/M2 | TEMPERATURE: 97.8 F | DIASTOLIC BLOOD PRESSURE: 79 MMHG | HEIGHT: 64 IN | OXYGEN SATURATION: 99 % | RESPIRATION RATE: 16 BRPM

## 2022-02-14 DIAGNOSIS — Z86.010 PERSONAL HISTORY OF COLONIC POLYPS: ICD-10-CM

## 2022-02-14 PROCEDURE — 45385 COLONOSCOPY W/LESION REMOVAL: CPT | Performed by: INTERNAL MEDICINE

## 2022-02-14 PROCEDURE — 0DBK8ZZ EXCISION OF ASCENDING COLON, VIA NATURAL OR ARTIFICIAL OPENING ENDOSCOPIC: ICD-10-PCS | Performed by: INTERNAL MEDICINE

## 2022-02-14 PROCEDURE — 45380 COLONOSCOPY AND BIOPSY: CPT | Performed by: INTERNAL MEDICINE

## 2022-02-14 PROCEDURE — 88305 TISSUE EXAM BY PATHOLOGIST: CPT | Performed by: INTERNAL MEDICINE

## 2022-02-14 PROCEDURE — 25010000002 PROPOFOL 10 MG/ML EMULSION: Performed by: ANESTHESIOLOGY

## 2022-02-14 PROCEDURE — 25010000002 PHENYLEPHRINE 10 MG/ML SOLUTION: Performed by: ANESTHESIOLOGY

## 2022-02-14 RX ORDER — SODIUM CHLORIDE 0.9 % (FLUSH) 0.9 %
10 SYRINGE (ML) INJECTION AS NEEDED
Status: DISCONTINUED | OUTPATIENT
Start: 2022-02-14 | End: 2022-02-14 | Stop reason: HOSPADM

## 2022-02-14 RX ORDER — SODIUM CHLORIDE 0.9 % (FLUSH) 0.9 %
3 SYRINGE (ML) INJECTION EVERY 12 HOURS SCHEDULED
Status: DISCONTINUED | OUTPATIENT
Start: 2022-02-14 | End: 2022-02-14 | Stop reason: HOSPADM

## 2022-02-14 RX ORDER — SODIUM CHLORIDE, SODIUM LACTATE, POTASSIUM CHLORIDE, CALCIUM CHLORIDE 600; 310; 30; 20 MG/100ML; MG/100ML; MG/100ML; MG/100ML
30 INJECTION, SOLUTION INTRAVENOUS CONTINUOUS PRN
Status: DISCONTINUED | OUTPATIENT
Start: 2022-02-14 | End: 2022-02-14 | Stop reason: HOSPADM

## 2022-02-14 RX ORDER — PHENYLEPHRINE HYDROCHLORIDE 10 MG/ML
INJECTION INTRAVENOUS AS NEEDED
Status: DISCONTINUED | OUTPATIENT
Start: 2022-02-14 | End: 2022-02-14 | Stop reason: SURG

## 2022-02-14 RX ORDER — PROPOFOL 10 MG/ML
VIAL (ML) INTRAVENOUS AS NEEDED
Status: DISCONTINUED | OUTPATIENT
Start: 2022-02-14 | End: 2022-02-14 | Stop reason: SURG

## 2022-02-14 RX ORDER — DEXTROSE MONOHYDRATE 25 G/50ML
50 INJECTION, SOLUTION INTRAVENOUS
Status: DISCONTINUED | OUTPATIENT
Start: 2022-02-14 | End: 2022-02-14 | Stop reason: HOSPADM

## 2022-02-14 RX ORDER — LIDOCAINE HYDROCHLORIDE 20 MG/ML
INJECTION, SOLUTION INFILTRATION; PERINEURAL AS NEEDED
Status: DISCONTINUED | OUTPATIENT
Start: 2022-02-14 | End: 2022-02-14 | Stop reason: SURG

## 2022-02-14 RX ORDER — MAGNESIUM HYDROXIDE 1200 MG/15ML
LIQUID ORAL AS NEEDED
Status: DISCONTINUED | OUTPATIENT
Start: 2022-02-14 | End: 2022-02-14 | Stop reason: HOSPADM

## 2022-02-14 RX ADMIN — PHENYLEPHRINE HYDROCHLORIDE 100 MCG: 10 INJECTION INTRAVENOUS at 08:45

## 2022-02-14 RX ADMIN — PROPOFOL 150 MG: 10 INJECTION, EMULSION INTRAVENOUS at 08:40

## 2022-02-14 RX ADMIN — DEXTROSE MONOHYDRATE 50 ML: 500 INJECTION PARENTERAL at 07:20

## 2022-02-14 RX ADMIN — SODIUM CHLORIDE, POTASSIUM CHLORIDE, SODIUM LACTATE AND CALCIUM CHLORIDE 30 ML/HR: 600; 310; 30; 20 INJECTION, SOLUTION INTRAVENOUS at 07:20

## 2022-02-14 RX ADMIN — PROPOFOL 20 MG: 10 INJECTION, EMULSION INTRAVENOUS at 08:54

## 2022-02-14 RX ADMIN — PHENYLEPHRINE HYDROCHLORIDE 100 MCG: 10 INJECTION INTRAVENOUS at 08:51

## 2022-02-14 RX ADMIN — PROPOFOL 250 MCG/KG/MIN: 10 INJECTION, EMULSION INTRAVENOUS at 08:40

## 2022-02-14 RX ADMIN — LIDOCAINE HYDROCHLORIDE 60 MG: 20 INJECTION, SOLUTION INFILTRATION; PERINEURAL at 08:40

## 2022-02-14 NOTE — PERIOPERATIVE NURSING NOTE
DR. WAY FROM ANESTHESIA AT BEDSIDE AND AWARE OF ALL EVENTS THAT OCCURRED WITH HIM THIS AM. OK FOR PROCEDURE.

## 2022-02-14 NOTE — PRE-PROCEDURE NOTE
UPON GETTING PT FROM [facility], PT WAS NOTED TO BE VERY ATAXIC AND COMPLAINED OF DIZZINESS WITH DOUBLE VISION. PT PLACED ON STRETCHER AND VSS OBTAINED. /100  . RESP 22 AND O2 SATS 96. DENIES ANY OTHER COMPLAINTS. STATED ALL SYMPTOMS STARTED THIS AM WHEN HE GOT UP. BLOOD SUGAR 52. DENIES BEING HX OF DIABETES, LOW BLOOD SUGAR ETC. IV PLACED AND ANESTHESIA MADE AWARE. DR. ADAM AT BEDSIDE. AWARE OF ALL EVENTS. 1/2 AMP D 50 ORDERED AS ORDERED IV. GIVEN WITHOUT INCIDENT . PT STATES FEELING MUCH BETTER. DENIES DIPLOPIA, DIZZINESS. MONITORING ONGOING.FINISHED GETTING PT READY FOR HIS COLONOSCOPY TOADY.

## 2022-02-14 NOTE — ANESTHESIA POSTPROCEDURE EVALUATION
"Patient: Ace Lane Jr.    Procedure Summary     Date: 02/14/22 Room / Location: [facility]    Anesthesia Start: 0834 Anesthesia Stop: 0905    Procedure: COLONOSCOPY WITH POLYPECTOMY (N/A ) Diagnosis:       Personal history of colonic polyps      (Personal history of colonic polyps [Z86.010])    Surgeons: Charles Luevano MD Provider: Koffi Dickson MD    Anesthesia Type: MAC ASA Status: 3          Anesthesia Type: MAC    Vitals  Vitals Value Taken Time   /79 02/14/22 0927   Temp 36.6 °C (97.8 °F) 02/14/22 0904   Pulse 83 02/14/22 0927   Resp 16 02/14/22 0927   SpO2 99 % 02/14/22 0927           Post Anesthesia Care and Evaluation    Patient location during evaluation: bedside  Patient participation: complete - patient participated  Level of consciousness: awake and alert  Pain management: adequate  Airway patency: patent  Anesthetic complications: No anesthetic complications    Cardiovascular status: acceptable  Respiratory status: acceptable  Hydration status: acceptable    Comments: /79 (BP Location: Left arm, Patient Position: Lying)   Pulse 83   Temp 36.6 °C (97.8 °F) (Temporal)   Resp 16   Ht 162.6 cm (64\")   Wt 64.9 kg (143 lb)   SpO2 99%   BMI 24.55 kg/m². Glucose reported as 96 per nursing. Pt statst that he feels \"great\" and \"back to normal.\" Agrees to see PCP tomorrow and alert wife if he feels any symptoms before that.       "

## 2022-02-14 NOTE — H&P
No chief complaint on file.      HPI  Patient here today for a colonoscopy.  He had a prior colonoscopy with polyps and a poor prep.         Problem List:    Patient Active Problem List   Diagnosis   • Right upper quadrant abdominal pain   • Weight loss   • Nausea   • Colon cancer screening   • Hx of colonic polyps       Medical History:    Past Medical History:   Diagnosis Date   • COPD (chronic obstructive pulmonary disease) (HCC)    • Hypertension         Social History:    Social History     Socioeconomic History   • Marital status:    Tobacco Use   • Smoking status: Current Every Day Smoker     Packs/day: 1.00     Types: Cigarettes   • Smokeless tobacco: Never Used   Vaping Use   • Vaping Use: Never used   Substance and Sexual Activity   • Alcohol use: Yes     Comment: 2-3 cans per day   • Drug use: No   • Sexual activity: Defer       Family History:   Family History   Problem Relation Age of Onset   • Prostate cancer Father    • Colon cancer Neg Hx    • Colon polyps Neg Hx    • Malig Hyperthermia Neg Hx        Surgical History:   Past Surgical History:   Procedure Laterality Date   • COLONOSCOPY  2011    Dr. Ross.    • COLONOSCOPY N/A 5/24/2021    Procedure: COLONOSCOPY WITH POLYPECTOMIES;  Surgeon: Charles Luevano MD;  Location: [facility] MAIN OR;  Service: Gastroenterology;  Laterality: N/A;  POOR PREP, POLYPS X 2   • ENDOSCOPY N/A 5/24/2021    Procedure: ESOPHAGOGASTRODUODENOSCOPY WITH BIOPSIES;  Surgeon: Charles Luevano MD;  Location: [facility] MAIN OR;  Service: Gastroenterology;  Laterality: N/A;  GASTRITIS, ESOPHAGITIS, QUESTIONABLE CANDIDA, DUODENAL BULB ULCER   • FACIAL COSMETIC SURGERY  1992   • UPPER GASTROINTESTINAL ENDOSCOPY  2011    Dr. Ross         Current Facility-Administered Medications:   •  lactated ringers infusion, 30 mL/hr, Intravenous, Continuous PRN, Charles Luevano MD  •  sodium chloride 0.9 % flush 10 mL, 10 mL, Intravenous, PRN, Charles Luevano MD  •   sodium chloride 0.9 % flush 3 mL, 3 mL, Intravenous, Q12H, Charles Luevano MD    Allergies:   Allergies   Allergen Reactions   • Codeine Hives        The following portions of the patient's history were reviewed by me and updated as appropriate: review of systems, allergies, current medications, past family history, past medical history, past social history, past surgical history and problem list.    There were no vitals filed for this visit.    PHYSICAL EXAM:    CONSTITUTIONAL:  today's vital signs reviewed by me  GASTROINTESTINAL: abdomen is soft nontender nondistended with normal active bowel sounds, no masses are appreciated    Assessment/ Plan  We will proceed today with colonoscopy.    Risks and benefits as well as alternatives to endoscopic evaluation were explained to the patient and they voiced understanding and wish to proceed.  These risks include but are not limited to the risk of bleeding, perforation, adverse reaction to sedation, and missed lesions.  The patient was given the opportunity to ask questions prior to the endoscopic procedure.

## 2022-02-14 NOTE — ANESTHESIA PREPROCEDURE EVALUATION
" Anesthesia Evaluation     Patient summary reviewed   NPO Solid Status: > 8 hours  NPO Liquid Status: > 2 hours           Airway   Mallampati: I  TM distance: >3 FB  Neck ROM: full  Dental    (+) poor dentition    Pulmonary     breath sounds clear to auscultation  (+) a smoker Current, COPD,   (-) shortness of breath  Cardiovascular   Exercise tolerance: good (4-7 METS)    Rhythm: regular  Rate: normal    (+) hypertension,   (-) angina, CARVER      Neuro/Psych  GI/Hepatic/Renal/Endo      Musculoskeletal     Abdominal    Substance History   (+) alcohol use,      OB/GYN          Other                        Anesthesia Plan    ASA 3     MAC   (MAC anesthesia discussed with patient and wife. Risks (including but not limited to intra-op awareness), benefits, and alternatives were discussed. Understanding was voiced with an agreement to proceed with a MAC technique and General as a backup option. I have explained other risks of anesthesia including but not limited to dental damage, corneal abrasion, nerve injury, MI, stroke, and death. All questions asked and answered. Pt. Admits to drinking at least 4 beers with his prep last night and states that he started to feel \"weird\" this morning for the first time. Blood glucose found to be in the 50s this morning. After 1/2 amp of D50, the patient's symptoms resolved. I requested that his wife call his PCP today while we are in the procedure room to discuss his blood sugar and symptoms. The patient and wife agreed that she would call PCP for further advice for later today such as urgent care/PCP office/ or phone management. GI doc also alerted who states he will f/u regarding potential liver issues etc.    )  intravenous induction     Anesthetic plan, all risks, benefits, and alternatives have been provided, discussed and informed consent has been obtained with: patient.        CODE STATUS:       "

## 2022-02-14 NOTE — PERIOPERATIVE NURSING NOTE
SPOKE WITH DR. BUCHANAN. AWARE OF ALL EVENTS THAT OCCURRED THIS AM. NO NEW ORDERS. PROCEEDING WITH COLONOSCOPY.

## 2022-02-14 NOTE — NURSING NOTE
Blood glucose recheck per fingerstick 96. Pt alert and oriented. No tremors or diaphoresis noted. Pt states feels well. Tolerated PO soft drink. Wife at [facility].

## 2022-02-15 ENCOUNTER — OFFICE VISIT (OUTPATIENT)
Dept: INTERNAL MEDICINE | Facility: CLINIC | Age: 57
End: 2022-02-15

## 2022-02-15 VITALS
HEART RATE: 96 BPM | SYSTOLIC BLOOD PRESSURE: 130 MMHG | HEIGHT: 64 IN | DIASTOLIC BLOOD PRESSURE: 80 MMHG | OXYGEN SATURATION: 96 % | BODY MASS INDEX: 23.9 KG/M2 | RESPIRATION RATE: 16 BRPM | TEMPERATURE: 97.9 F | WEIGHT: 140 LBS

## 2022-02-15 DIAGNOSIS — Z72.0 TOBACCO ABUSE DISORDER: ICD-10-CM

## 2022-02-15 DIAGNOSIS — Z12.5 ENCOUNTER FOR SCREENING FOR MALIGNANT NEOPLASM OF PROSTATE: ICD-10-CM

## 2022-02-15 DIAGNOSIS — Z00.00 ROUTINE HEALTH MAINTENANCE: ICD-10-CM

## 2022-02-15 DIAGNOSIS — J44.9 CHRONIC OBSTRUCTIVE PULMONARY DISEASE, UNSPECIFIED COPD TYPE: Primary | ICD-10-CM

## 2022-02-15 DIAGNOSIS — K40.90 RIGHT INGUINAL HERNIA: ICD-10-CM

## 2022-02-15 LAB
LAB AP CASE REPORT: NORMAL
LAB AP CLINICAL INFORMATION: NORMAL
PATH REPORT.FINAL DX SPEC: NORMAL
PATH REPORT.GROSS SPEC: NORMAL

## 2022-02-15 PROCEDURE — 99214 OFFICE O/P EST MOD 30 MIN: CPT | Performed by: NURSE PRACTITIONER

## 2022-02-15 NOTE — PROGRESS NOTES
"Subjective    Ace Lane Jr. is a 56 y.o. male presenting today for   Chief Complaint   Patient presents with   • Establish Care       History of Present Illness     Ace Lane Jr. presents today as a new patient to me to establish care.   Prior PCP was Luz but this was many years.  Patient Care Team:  Ophelia Garsia APRN as PCP - General (Family Medicine)  Charles Luevano MD as Consulting Physician (Gastroenterology)    Current/chronic health conditions include:    Patient Active Problem List   Diagnosis   • Right upper quadrant abdominal pain   • Weight loss   • Nausea   • Colon cancer screening   • Hx of colonic polyps   • COPD (chronic obstructive pulmonary disease) ([facility])       Outpatient Medications Marked as Taking for the 2/15/22 encounter (Office Visit) with Ophelia Garsia APRN   Medication Sig Dispense Refill   • Cetirizine HCl (Allergy Relief) 10 MG capsule Take  by mouth.     • pantoprazole (PROTONIX) 40 MG EC tablet Take 1 tablet by mouth Daily. 90 tablet 3       He reports a h/o COPD. This dx was approx 5-6 yrs ago. He has inhalers at home but does not use them and probably has not in approx one yrs. He notes an intermittent productive cough that is worse int he a.m. He denies wheezing. He reports CARVER.      New complaints today include:  He notes a bulge in his R groin. This is painful. Worse when he coughs. This has been present for 4-6 weeks. His PMH is significant for a R inguinal hernia that was closed when he was 9yo.      The following portions of the patient's history were reviewed and updated as appropriate: allergies, current medications, problem list, past medical history, past surgical history, family history, and social history.         Objective    Vitals:    02/15/22 0949   BP: 130/80   Pulse: 96   Resp: 16   Temp: 97.9 °F (36.6 °C)   SpO2: 96%   Weight: 63.5 kg (140 lb)   Height: 162.6 cm (64.02\")     Body mass index is 24.02 kg/m².  Nursing notes " and vitals reviewed.    Physical Exam  Constitutional:       General: He is not in acute distress.     Appearance: He is well-developed.   Neck:      Thyroid: No thyroid mass or thyromegaly.   Cardiovascular:      Rate and Rhythm: Regular rhythm.      Heart sounds: Normal heart sounds.   Pulmonary:      Effort: Pulmonary effort is normal.      Breath sounds: Normal breath sounds.   Musculoskeletal:      Cervical back: Neck supple.   Lymphadenopathy:      Cervical: No cervical adenopathy.   Neurological:      Mental Status: He is alert and oriented to person, place, and time.   Psychiatric:         Attention and Perception: He is attentive.         Mood and Affect: Mood and affect normal.         Speech: Speech normal.         Behavior: Behavior normal.         Thought Content: Thought content normal.           Assessment and Plan    Diagnoses and all orders for this visit:    1. Chronic obstructive pulmonary disease, unspecified COPD type (HCC) (Primary)  Comments:  - encouraged tobacco cessation    2. Tobacco abuse disorder  Comments:  - encouraged cessation    3. Right inguinal hernia  Comments:  - pt declines referral to surgery at this time  - counseled re emergent S&S    4. Routine health maintenance  -     CBC (No Diff)  -     Comprehensive Metabolic Panel  -     Lipid Panel With / Chol / HDL Ratio  -     PSA Screen  -     TSH  -     Urinalysis With Culture If Indicated - Urine, Clean Catch    5. Encounter for screening for malignant neoplasm of prostate  -     PSA Screen            The plan of care was discussed. All questions were answered. Patient verbalized understanding.        Return in about 4 weeks (around 3/15/2022) for Annual physical.

## 2022-02-18 LAB
ALBUMIN SERPL-MCNC: 4.1 G/DL (ref 3.8–4.9)
ALBUMIN/GLOB SERPL: 2.1 {RATIO} (ref 1.2–2.2)
ALP SERPL-CCNC: 60 IU/L (ref 44–121)
ALT SERPL-CCNC: 21 IU/L (ref 0–44)
APPEARANCE UR: CLEAR
AST SERPL-CCNC: 22 IU/L (ref 0–40)
BACTERIA #/AREA URNS HPF: NORMAL /[HPF]
BACTERIA UR CULT: NORMAL
BACTERIA UR CULT: NORMAL
BILIRUB SERPL-MCNC: 0.5 MG/DL (ref 0–1.2)
BILIRUB UR QL STRIP: NEGATIVE
BUN SERPL-MCNC: 15 MG/DL (ref 6–24)
BUN/CREAT SERPL: 23 (ref 9–20)
CALCIUM SERPL-MCNC: 9.1 MG/DL (ref 8.7–10.2)
CASTS URNS QL MICRO: NORMAL /LPF
CHLORIDE SERPL-SCNC: 96 MMOL/L (ref 96–106)
CHOLEST SERPL-MCNC: 175 MG/DL (ref 100–199)
CHOLEST/HDLC SERPL: 2.1 RATIO (ref 0–5)
CO2 SERPL-SCNC: 25 MMOL/L (ref 20–29)
COLOR UR: YELLOW
CREAT SERPL-MCNC: 0.66 MG/DL (ref 0.76–1.27)
EPI CELLS #/AREA URNS HPF: NORMAL /HPF (ref 0–10)
ERYTHROCYTE [DISTWIDTH] IN BLOOD BY AUTOMATED COUNT: 13 % (ref 11.6–15.4)
GLOBULIN SER CALC-MCNC: 2 G/DL (ref 1.5–4.5)
GLUCOSE SERPL-MCNC: 96 MG/DL (ref 65–99)
GLUCOSE UR QL STRIP: NEGATIVE
HCT VFR BLD AUTO: 43.8 % (ref 37.5–51)
HDLC SERPL-MCNC: 85 MG/DL
HGB BLD-MCNC: 15 G/DL (ref 13–17.7)
HGB UR QL STRIP: NEGATIVE
KETONES UR QL STRIP: NEGATIVE
LDLC SERPL CALC-MCNC: 81 MG/DL (ref 0–99)
LEUKOCYTE ESTERASE UR QL STRIP: ABNORMAL
MCH RBC QN AUTO: 32.5 PG (ref 26.6–33)
MCHC RBC AUTO-ENTMCNC: 34.2 G/DL (ref 31.5–35.7)
MCV RBC AUTO: 95 FL (ref 79–97)
MICRO URNS: ABNORMAL
NITRITE UR QL STRIP: NEGATIVE
PH UR STRIP: 7 [PH] (ref 5–7.5)
PLATELET # BLD AUTO: 184 X10E3/UL (ref 150–450)
POTASSIUM SERPL-SCNC: 4.2 MMOL/L (ref 3.5–5.2)
PROT SERPL-MCNC: 6.1 G/DL (ref 6–8.5)
PROT UR QL STRIP: NEGATIVE
PSA SERPL-MCNC: 1.5 NG/ML (ref 0–4)
RBC # BLD AUTO: 4.62 X10E6/UL (ref 4.14–5.8)
RBC #/AREA URNS HPF: NORMAL /HPF (ref 0–2)
SODIUM SERPL-SCNC: 136 MMOL/L (ref 134–144)
SP GR UR STRIP: 1.02 (ref 1–1.03)
TRIGL SERPL-MCNC: 43 MG/DL (ref 0–149)
TSH SERPL DL<=0.005 MIU/L-ACNC: 1.09 UIU/ML (ref 0.45–4.5)
URINALYSIS REFLEX: ABNORMAL
UROBILINOGEN UR STRIP-MCNC: 0.2 MG/DL (ref 0.2–1)
VLDLC SERPL CALC-MCNC: 9 MG/DL (ref 5–40)
WBC # BLD AUTO: 6.4 X10E3/UL (ref 3.4–10.8)
WBC #/AREA URNS HPF: NORMAL /HPF (ref 0–5)

## 2022-03-02 ENCOUNTER — PATIENT ROUNDING (BHMG ONLY) (OUTPATIENT)
Dept: INTERNAL MEDICINE | Facility: CLINIC | Age: 57
End: 2022-03-02

## 2022-03-09 ENCOUNTER — TELEPHONE (OUTPATIENT)
Dept: INTERNAL MEDICINE | Facility: CLINIC | Age: 57
End: 2022-03-09

## 2022-03-14 ENCOUNTER — OFFICE VISIT (OUTPATIENT)
Dept: SURGERY | Facility: CLINIC | Age: 57
End: 2022-03-14

## 2022-03-14 VITALS
DIASTOLIC BLOOD PRESSURE: 62 MMHG | WEIGHT: 144 LBS | BODY MASS INDEX: 24.59 KG/M2 | SYSTOLIC BLOOD PRESSURE: 122 MMHG | HEIGHT: 64 IN

## 2022-03-14 DIAGNOSIS — K40.91 RECURRENT RIGHT INGUINAL HERNIA: Primary | ICD-10-CM

## 2022-03-14 PROCEDURE — 99214 OFFICE O/P EST MOD 30 MIN: CPT | Performed by: SURGERY

## 2022-03-14 NOTE — H&P
PATIENT INFORMATION  Ace Lane Jr.       - 1965    CHIEF COMPLAINT  Chief Complaint   Patient presents with   • Hernia   possible right inguinal hernia. Pain when coughing since December. No imaging.    HISTORY OF PRESENT ILLNESS  HPI complains of right groin pain and intermittent swelling since December.  He noticed that after coughing.  He is a smoker.  He has had a prior inguinal hernia repair on the right as a child when he was 8 years old.        REVIEW OF SYSTEMS  Review of Systems      ACTIVE PROBLEMS  Patient Active Problem List    Diagnosis    • COPD (chronic obstructive pulmonary disease) (HCC) [J44.9]    • Hx of colonic polyps [Z86.010]    • Right upper quadrant abdominal pain [R10.11]    • Weight loss [R63.4]    • Nausea [R11.0]    • Colon cancer screening [Z12.11]          PAST MEDICAL HISTORY  Past Medical History:   Diagnosis Date   • COPD (chronic obstructive pulmonary disease) (HCC)    • Hypertension          SURGICAL HISTORY  Past Surgical History:   Procedure Laterality Date   • COLONOSCOPY      Dr. Ross.    • COLONOSCOPY N/A 2021    Procedure: COLONOSCOPY WITH POLYPECTOMIES;  Surgeon: Charles Luevano MD;  Location: [facility] MAIN OR;  Service: Gastroenterology;  Laterality: N/A;  POOR PREP, POLYPS X 2   • COLONOSCOPY  2022   • COLONOSCOPY N/A 2022    Procedure: COLONOSCOPY WITH POLYPECTOMY;  Surgeon: Charles Luevano MD;  Location: [facility] MAIN OR;  Service: Gastroenterology;  Laterality: N/A;  POLYPS, DIVERTICULOSIS   • ENDOSCOPY N/A 2021    Procedure: ESOPHAGOGASTRODUODENOSCOPY WITH BIOPSIES;  Surgeon: Charles Luevano MD;  Location: [facility] MAIN OR;  Service: Gastroenterology;  Laterality: N/A;  GASTRITIS, ESOPHAGITIS, QUESTIONABLE CANDIDA, DUODENAL BULB ULCER   • FACIAL COSMETIC SURGERY     • INGUINAL HERNIA REPAIR Right    • UPPER GASTROINTESTINAL ENDOSCOPY      Dr. Ross         FAMILY HISTORY  Family History   Problem  "Relation Age of Onset   • Prostate cancer Father    • Colon cancer Neg Hx    • Colon polyps Neg Hx    • Malig Hyperthermia Neg Hx          SOCIAL HISTORY  Social History     Occupational History   • Not on file   Tobacco Use   • Smoking status: Current Every Day Smoker     Packs/day: 0.50     Types: Cigarettes     Start date: 1984   • Smokeless tobacco: Never Used   • Tobacco comment: He has smoked as much as 2PPD   Vaping Use   • Vaping Use: Never used   Substance and Sexual Activity   • Alcohol use: Yes     Comment: 2-3 cans per day   • Drug use: No   • Sexual activity: Defer         CURRENT MEDICATIONS    Current Outpatient Medications:   •  albuterol (PROAIR RESPICLICK) 108 (90 Base) MCG/ACT inhaler, Inhale 2 puffs Every 4 (Four) Hours As Needed for Wheezing., Disp: 1 inhaler, Rfl: 1  •  Cetirizine HCl (Allergy Relief) 10 MG capsule, Take  by mouth., Disp: , Rfl:   •  ipratropium-albuterol (DUO-NEB) 0.5-2.5 mg/3 ml nebulizer, Take 3 mL by nebulization Every 4 (Four) Hours As Needed for Wheezing., Disp: 90 mL, Rfl: 0  •  pantoprazole (PROTONIX) 40 MG EC tablet, Take 1 tablet by mouth Daily., Disp: 90 tablet, Rfl: 3    ALLERGIES  Codeine    VITALS  Vitals:    03/14/22 1507   BP: 122/62   Weight: 65.3 kg (144 lb)   Height: 162.6 cm (64\")       LAST RESULTS   Office Visit on 02/15/2022   Component Date Value Ref Range Status   • WBC 02/15/2022 6.4  3.4 - 10.8 x10E3/uL Final   • RBC 02/15/2022 4.62  4.14 - 5.80 x10E6/uL Final   • Hemoglobin 02/15/2022 15.0  13.0 - 17.7 g/dL Final   • Hematocrit 02/15/2022 43.8  37.5 - 51.0 % Final   • MCV 02/15/2022 95  79 - 97 fL Final   • MCH 02/15/2022 32.5  26.6 - 33.0 pg Final   • MCHC 02/15/2022 34.2  31.5 - 35.7 g/dL Final   • RDW 02/15/2022 13.0  11.6 - 15.4 % Final   • Platelets 02/15/2022 184  150 - 450 x10E3/uL Final   • Glucose 02/15/2022 96  65 - 99 mg/dL Final   • BUN 02/15/2022 15  6 - 24 mg/dL Final   • Creatinine 02/15/2022 0.66 (A) 0.76 - 1.27 mg/dL Final   • eGFR " Non  Am 02/15/2022 109  >59 mL/min/1.73 Final   • eGFR African Am 02/15/2022 126  >59 mL/min/1.73 Final    Comment: **In accordance with recommendations from the NKF-ASN Task force,**    [facility] the [facility] of updating its eGFR calculation to the    2021 CKD-EPI creatinine equation that estimates kidney function    without a race variable.     • BUN/Creatinine Ratio 02/15/2022 23 (A) 9 - 20 Final   • Sodium 02/15/2022 136  134 - 144 mmol/L Final   • Potassium 02/15/2022 4.2  3.5 - 5.2 mmol/L Final   • Chloride 02/15/2022 96  96 - 106 mmol/L Final   • Total CO2 02/15/2022 25  20 - 29 mmol/L Final   • Calcium 02/15/2022 9.1  8.7 - 10.2 mg/dL Final   • Total Protein 02/15/2022 6.1  6.0 - 8.5 g/dL Final   • Albumin 02/15/2022 4.1  3.8 - 4.9 g/dL Final   • Globulin 02/15/2022 2.0  1.5 - 4.5 g/dL Final   • A/G Ratio 02/15/2022 2.1  1.2 - 2.2 Final   • Total Bilirubin 02/15/2022 0.5  0.0 - 1.2 mg/dL Final   • Alkaline Phosphatase 02/15/2022 60  44 - 121 IU/L Final   • AST (SGOT) 02/15/2022 22  0 - 40 IU/L Final   • ALT (SGPT) 02/15/2022 21  0 - 44 IU/L Final   • Total Cholesterol 02/15/2022 175  100 - 199 mg/dL Final   • Triglycerides 02/15/2022 43  0 - 149 mg/dL Final   • HDL Cholesterol 02/15/2022 85  >39 mg/dL Final   • VLDL Cholesterol Willem 02/15/2022 9  5 - 40 mg/dL Final   • LDL Chol Calc (NIH) 02/15/2022 81  0 - 99 mg/dL Final   • Chol/HDL Ratio 02/15/2022 2.1  0.0 - 5.0 ratio Final    Comment:                                   T. Chol/HDL Ratio                                              Men  Women                                1/2 Avg.Risk  3.4    3.3                                    Avg.Risk  5.0    4.4                                 2X Avg.Risk  9.6    7.1                                 3X Avg.Risk 23.4   11.0     • PSA 02/15/2022 1.5  0.0 - 4.0 ng/mL Final    Comment: Roche ECLIA methodology.  According to the American Urological Association, Serum PSA should  decrease and remain at  undetectable levels after radical  prostatectomy. The AUA defines biochemical recurrence as an initial  PSA value 0.2 ng/mL or greater followed by a subsequent confirmatory  PSA value 0.2 ng/mL or greater.  Values obtained with different assay methods or kits cannot be used  interchangeably. Results cannot be interpreted as absolute evidence  of the presence or absence of malignant disease.     • TSH 02/15/2022 1.090  0.450 - 4.500 uIU/mL Final   • Specific Gravity, UA 02/15/2022 1.017  1.005 - 1.030 Final   • pH, UA 02/15/2022 7.0  5.0 - 7.5 Final   • Color, UA 02/15/2022 Yellow  Yellow Final   • Appearance, UA 02/15/2022 Clear  Clear Final   • Leukocytes, UA 02/15/2022 Trace (A) Negative Final   • Protein 02/15/2022 Negative  Negative/Trace Final   • Glucose, UA 02/15/2022 Negative  Negative Final   • Ketones 02/15/2022 Negative  Negative Final   • Blood, UA 02/15/2022 Negative  Negative Final   • Bilirubin, UA 02/15/2022 Negative  Negative Final   • Urobilinogen, UA 02/15/2022 0.2  0.2 - 1.0 mg/dL Final   • Nitrite, UA 02/15/2022 Negative  Negative Final   • Microscopic Examination 02/15/2022 See below:   Final    Microscopic was indicated and was performed.   • Urinalysis Reflex 02/15/2022 Comment   Final    This specimen has reflexed to a Urine Culture.   • WBC, UA 02/15/2022 None seen  0 - 5 /hpf Final   • RBC, UA 02/15/2022 0-2  0 - 2 /hpf Final   • Epithelial Cells (non renal) 02/15/2022 None seen  0 - 10 /hpf Final   • Casts 02/15/2022 None seen  None seen /lpf Final   • Bacteria, UA 02/15/2022 None seen  None seen/Few Final   • Urine Culture 02/15/2022 Final report   Final   • Result 1 02/15/2022 Comment   Final    No growth in 36 - 48 hours.     No results found.    PHYSICAL EXAM  Debilities/Disabilities Identified: None  Emotional Behavior: Appropriate  Physical Exam alert thin white male in no active distress his heart shows a regular rate and rhythm.  He is scattered end expiratory wheezes.  His testes  are descended bilaterally without mass.  He has no left groin impulse he does have a reducible mildly tender right inguinal hernia    ASSESSMENT  Recurrent right inguinal hernia      PLAN  The risk benefits and options were discussed with the patient in detail.  Including a 7% chance of postoperative pain.  We will proceed with repair of his recurrent right inguinal hernia at [facility] with placement of On-Q pump on March 21.  Discussed risks of surgery with patient and in particular increased risk of wound infection, poor wound healing, hernias (with abdominal surgery) and post-operative pulmonary complications associated with smoking.

## 2022-03-14 NOTE — PROGRESS NOTES
PATIENT INFORMATION  Ace Lane Jr.       - 1965    CHIEF COMPLAINT  Chief Complaint   Patient presents with   • Hernia   possible right inguinal hernia. Pain when coughing since December. No imaging.    HISTORY OF PRESENT ILLNESS  HPI complains of right groin pain and intermittent swelling since December.  He noticed that after coughing.  He is a smoker.  He has had a prior inguinal hernia repair on the right as a child when he was 8 years old.        REVIEW OF SYSTEMS  Review of Systems      ACTIVE PROBLEMS  Patient Active Problem List    Diagnosis    • COPD (chronic obstructive pulmonary disease) (HCC) [J44.9]    • Hx of colonic polyps [Z86.010]    • Right upper quadrant abdominal pain [R10.11]    • Weight loss [R63.4]    • Nausea [R11.0]    • Colon cancer screening [Z12.11]          PAST MEDICAL HISTORY  Past Medical History:   Diagnosis Date   • COPD (chronic obstructive pulmonary disease) (HCC)    • Hypertension          SURGICAL HISTORY  Past Surgical History:   Procedure Laterality Date   • COLONOSCOPY      Dr. Ross.    • COLONOSCOPY N/A 2021    Procedure: COLONOSCOPY WITH POLYPECTOMIES;  Surgeon: Charles Luevano MD;  Location: [facility] [place] MAIN OR;  Service: Gastroenterology;  Laterality: N/A;  POOR PREP, POLYPS X 2   • COLONOSCOPY  2022   • COLONOSCOPY N/A 2022    Procedure: COLONOSCOPY WITH POLYPECTOMY;  Surgeon: Charles Luevano MD;  Location: [facility] [place] MAIN OR;  Service: Gastroenterology;  Laterality: N/A;  POLYPS, DIVERTICULOSIS   • ENDOSCOPY N/A 2021    Procedure: ESOPHAGOGASTRODUODENOSCOPY WITH BIOPSIES;  Surgeon: Charles Luevano MD;  Location: [facility] [place] MAIN OR;  Service: Gastroenterology;  Laterality: N/A;  GASTRITIS, ESOPHAGITIS, QUESTIONABLE CANDIDA, DUODENAL BULB ULCER   • FACIAL COSMETIC SURGERY     • INGUINAL HERNIA REPAIR Right    • UPPER GASTROINTESTINAL ENDOSCOPY      Dr. Ross         FAMILY HISTORY  Family History   Problem  "Relation Age of Onset   • Prostate cancer Father    • Colon cancer Neg Hx    • Colon polyps Neg Hx    • Malig Hyperthermia Neg Hx          SOCIAL HISTORY  Social History     Occupational History   • Not on file   Tobacco Use   • Smoking status: Current Every Day Smoker     Packs/day: 0.50     Types: Cigarettes     Start date: 1984   • Smokeless tobacco: Never Used   • Tobacco comment: He has smoked as much as 2PPD   Vaping Use   • Vaping Use: Never used   Substance and Sexual Activity   • Alcohol use: Yes     Comment: 2-3 cans per day   • Drug use: No   • Sexual activity: Defer         CURRENT MEDICATIONS    Current Outpatient Medications:   •  albuterol (PROAIR RESPICLICK) 108 (90 Base) MCG/ACT inhaler, Inhale 2 puffs Every 4 (Four) Hours As Needed for Wheezing., Disp: 1 inhaler, Rfl: 1  •  Cetirizine HCl (Allergy Relief) 10 MG capsule, Take  by mouth., Disp: , Rfl:   •  ipratropium-albuterol (DUO-NEB) 0.5-2.5 mg/3 ml nebulizer, Take 3 mL by nebulization Every 4 (Four) Hours As Needed for Wheezing., Disp: 90 mL, Rfl: 0  •  pantoprazole (PROTONIX) 40 MG EC tablet, Take 1 tablet by mouth Daily., Disp: 90 tablet, Rfl: 3    ALLERGIES  Codeine    VITALS  Vitals:    03/14/22 1507   BP: 122/62   Weight: 65.3 kg (144 lb)   Height: 162.6 cm (64\")       LAST RESULTS   Office Visit on 02/15/2022   Component Date Value Ref Range Status   • WBC 02/15/2022 6.4  3.4 - 10.8 x10E3/uL Final   • RBC 02/15/2022 4.62  4.14 - 5.80 x10E6/uL Final   • Hemoglobin 02/15/2022 15.0  13.0 - 17.7 g/dL Final   • Hematocrit 02/15/2022 43.8  37.5 - 51.0 % Final   • MCV 02/15/2022 95  79 - 97 fL Final   • MCH 02/15/2022 32.5  26.6 - 33.0 pg Final   • MCHC 02/15/2022 34.2  31.5 - 35.7 g/dL Final   • RDW 02/15/2022 13.0  11.6 - 15.4 % Final   • Platelets 02/15/2022 184  150 - 450 x10E3/uL Final   • Glucose 02/15/2022 96  65 - 99 mg/dL Final   • BUN 02/15/2022 15  6 - 24 mg/dL Final   • Creatinine 02/15/2022 0.66 (A) 0.76 - 1.27 mg/dL Final   • eGFR " Non  Am 02/15/2022 109  >59 mL/min/1.73 Final   • eGFR African Am 02/15/2022 126  >59 mL/min/1.73 Final    Comment: **In accordance with recommendations from the NKF-ASN Task force,**    [facility] its eGFR calculation to the    2021 CKD-EPI creatinine equation that estimates kidney function    without a race variable.     • BUN/Creatinine Ratio 02/15/2022 23 (A) 9 - 20 Final   • Sodium 02/15/2022 136  134 - 144 mmol/L Final   • Potassium 02/15/2022 4.2  3.5 - 5.2 mmol/L Final   • Chloride 02/15/2022 96  96 - 106 mmol/L Final   • Total CO2 02/15/2022 25  20 - 29 mmol/L Final   • Calcium 02/15/2022 9.1  8.7 - 10.2 mg/dL Final   • Total Protein 02/15/2022 6.1  6.0 - 8.5 g/dL Final   • Albumin 02/15/2022 4.1  3.8 - 4.9 g/dL Final   • Globulin 02/15/2022 2.0  1.5 - 4.5 g/dL Final   • A/G Ratio 02/15/2022 2.1  1.2 - 2.2 Final   • Total Bilirubin 02/15/2022 0.5  0.0 - 1.2 mg/dL Final   • Alkaline Phosphatase 02/15/2022 60  44 - 121 IU/L Final   • AST (SGOT) 02/15/2022 22  0 - 40 IU/L Final   • ALT (SGPT) 02/15/2022 21  0 - 44 IU/L Final   • Total Cholesterol 02/15/2022 175  100 - 199 mg/dL Final   • Triglycerides 02/15/2022 43  0 - 149 mg/dL Final   • HDL Cholesterol 02/15/2022 85  >39 mg/dL Final   • VLDL Cholesterol Willem 02/15/2022 9  5 - 40 mg/dL Final   • LDL Chol Calc (NIH) 02/15/2022 81  0 - 99 mg/dL Final   • Chol/HDL Ratio 02/15/2022 2.1  0.0 - 5.0 ratio Final    Comment:                                   T. Chol/HDL Ratio                                              Men  Women                                1/2 Avg.Risk  3.4    3.3                                    Avg.Risk  5.0    4.4                                 2X Avg.Risk  9.6    7.1                                 3X Avg.Risk 23.4   11.0     • PSA 02/15/2022 1.5  0.0 - 4.0 ng/mL Final    Comment: Roche ECLIA methodology.  According to the American Urological Association, Serum PSA should  decrease and remain at  undetectable levels after radical  prostatectomy. The AUA defines biochemical recurrence as an initial  PSA value 0.2 ng/mL or greater followed by a subsequent confirmatory  PSA value 0.2 ng/mL or greater.  Values obtained with different assay methods or kits cannot be used  interchangeably. Results cannot be interpreted as absolute evidence  of the presence or absence of malignant disease.     • TSH 02/15/2022 1.090  0.450 - 4.500 uIU/mL Final   • Specific Gravity, UA 02/15/2022 1.017  1.005 - 1.030 Final   • pH, UA 02/15/2022 7.0  5.0 - 7.5 Final   • Color, UA 02/15/2022 Yellow  Yellow Final   • Appearance, UA 02/15/2022 Clear  Clear Final   • Leukocytes, UA 02/15/2022 Trace (A) Negative Final   • Protein 02/15/2022 Negative  Negative/Trace Final   • Glucose, UA 02/15/2022 Negative  Negative Final   • Ketones 02/15/2022 Negative  Negative Final   • Blood, UA 02/15/2022 Negative  Negative Final   • Bilirubin, UA 02/15/2022 Negative  Negative Final   • Urobilinogen, UA 02/15/2022 0.2  0.2 - 1.0 mg/dL Final   • Nitrite, UA 02/15/2022 Negative  Negative Final   • Microscopic Examination 02/15/2022 See below:   Final    Microscopic was indicated and was performed.   • Urinalysis Reflex 02/15/2022 Comment   Final    This specimen has reflexed to a Urine Culture.   • WBC, UA 02/15/2022 None seen  0 - 5 /hpf Final   • RBC, UA 02/15/2022 0-2  0 - 2 /hpf Final   • Epithelial Cells (non renal) 02/15/2022 None seen  0 - 10 /hpf Final   • Casts 02/15/2022 None seen  None seen /lpf Final   • Bacteria, UA 02/15/2022 None seen  None seen/Few Final   • Urine Culture 02/15/2022 Final report   Final   • Result 1 02/15/2022 Comment   Final    No growth in 36 - 48 hours.     No results found.    PHYSICAL EXAM  Debilities/Disabilities Identified: None  Emotional Behavior: Appropriate  Physical Exam alert thin white male in no active distress his heart shows a regular rate and rhythm.  He is scattered end expiratory wheezes.  His testes  are descended bilaterally without mass.  He has no left groin impulse he does have a reducible mildly tender right inguinal hernia    ASSESSMENT  Recurrent right inguinal hernia      PLAN  The risk benefits and options were discussed with the patient in detail.  Including a 7% chance of postoperative pain.  We will proceed with repair of his recurrent right inguinal hernia at [facility] with placement of On-Q pump on March 21.  Discussed risks of surgery with patient and in particular increased risk of wound infection, poor wound healing, hernias (with abdominal surgery) and post-operative pulmonary complications associated with smoking.

## 2022-03-14 NOTE — H&P (VIEW-ONLY)
PATIENT INFORMATION  Ace Lane Jr.       - 1965    CHIEF COMPLAINT  Chief Complaint   Patient presents with   • Hernia   possible right inguinal hernia. Pain when coughing since December. No imaging.    HISTORY OF PRESENT ILLNESS  HPI complains of right groin pain and intermittent swelling since December.  He noticed that after coughing.  He is a smoker.  He has had a prior inguinal hernia repair on the right as a child when he was 8 years old.        REVIEW OF SYSTEMS  Review of Systems      ACTIVE PROBLEMS  Patient Active Problem List    Diagnosis    • COPD (chronic obstructive pulmonary disease) (HCC) [J44.9]    • Hx of colonic polyps [Z86.010]    • Right upper quadrant abdominal pain [R10.11]    • Weight loss [R63.4]    • Nausea [R11.0]    • Colon cancer screening [Z12.11]          PAST MEDICAL HISTORY  Past Medical History:   Diagnosis Date   • COPD (chronic obstructive pulmonary disease) (HCC)    • Hypertension          SURGICAL HISTORY  Past Surgical History:   Procedure Laterality Date   • COLONOSCOPY      Dr. Ross.    • COLONOSCOPY N/A 2021    Procedure: COLONOSCOPY WITH POLYPECTOMIES;  Surgeon: Charles Luevano MD;  [place] [facility] [place];  Service: Gastroenterology;  Laterality: N/A;  POOR PREP, POLYPS X 2   • COLONOSCOPY  2022   • COLONOSCOPY N/A 2022    Procedure: COLONOSCOPY WITH POLYPECTOMY;  Surgeon: Charles Luevano MD;  [place] [facility] [place];  Service: Gastroenterology;  Laterality: N/A;  POLYPS, DIVERTICULOSIS   • ENDOSCOPY N/A 2021    Procedure: ESOPHAGOGASTRODUODENOSCOPY WITH BIOPSIES;  Surgeon: Charles Luevano MD;  [place] [facility] [place];  Service: Gastroenterology;  Laterality: N/A;  GASTRITIS, ESOPHAGITIS, QUESTIONABLE CANDIDA, DUODENAL BULB ULCER   • FACIAL COSMETIC SURGERY     • INGUINAL HERNIA REPAIR Right    • UPPER GASTROINTESTINAL ENDOSCOPY      Dr. Ross         FAMILY HISTORY  Family History   Problem  "Relation Age of Onset   • Prostate cancer Father    • Colon cancer Neg Hx    • Colon polyps Neg Hx    • Malig Hyperthermia Neg Hx          SOCIAL HISTORY  Social History     Occupational History   • Not on file   Tobacco Use   • Smoking status: Current Every Day Smoker     Packs/day: 0.50     Types: Cigarettes     Start date: 1984   • Smokeless tobacco: Never Used   • Tobacco comment: He has smoked as much as 2PPD   Vaping Use   • Vaping Use: Never used   Substance and Sexual Activity   • Alcohol use: Yes     Comment: 2-3 cans per day   • Drug use: No   • Sexual activity: Defer         CURRENT MEDICATIONS    Current Outpatient Medications:   •  albuterol (PROAIR RESPICLICK) 108 (90 Base) MCG/ACT inhaler, Inhale 2 puffs Every 4 (Four) Hours As Needed for Wheezing., Disp: 1 inhaler, Rfl: 1  •  Cetirizine HCl (Allergy Relief) 10 MG capsule, Take  by mouth., Disp: , Rfl:   •  ipratropium-albuterol (DUO-NEB) 0.5-2.5 mg/3 ml nebulizer, Take 3 mL by nebulization Every 4 (Four) Hours As Needed for Wheezing., Disp: 90 mL, Rfl: 0  •  pantoprazole (PROTONIX) 40 MG EC tablet, Take 1 tablet by mouth Daily., Disp: 90 tablet, Rfl: 3    ALLERGIES  Codeine    VITALS  Vitals:    03/14/22 1507   BP: 122/62   Weight: 65.3 kg (144 lb)   Height: 162.6 cm (64\")       LAST RESULTS   Office Visit on 02/15/2022   Component Date Value Ref Range Status   • WBC 02/15/2022 6.4  3.4 - 10.8 x10E3/uL Final   • RBC 02/15/2022 4.62  4.14 - 5.80 x10E6/uL Final   • Hemoglobin 02/15/2022 15.0  13.0 - 17.7 g/dL Final   • Hematocrit 02/15/2022 43.8  37.5 - 51.0 % Final   • MCV 02/15/2022 95  79 - 97 fL Final   • MCH 02/15/2022 32.5  26.6 - 33.0 pg Final   • MCHC 02/15/2022 34.2  31.5 - 35.7 g/dL Final   • RDW 02/15/2022 13.0  11.6 - 15.4 % Final   • Platelets 02/15/2022 184  150 - 450 x10E3/uL Final   • Glucose 02/15/2022 96  65 - 99 mg/dL Final   • BUN 02/15/2022 15  6 - 24 mg/dL Final   • Creatinine 02/15/2022 0.66 (A) 0.76 - 1.27 mg/dL Final   • eGFR " Non  Am 02/15/2022 109  >59 mL/min/1.73 Final   • eGFR African Am 02/15/2022 126  >59 mL/min/1.73 Final    Comment: **In accordance with recommendations from the NKF-ASN Task force,**    [facility] is in the process of updating its eGFR calculation to the    2021 CKD-EPI creatinine equation that estimates kidney function    without a race variable.     • BUN/Creatinine Ratio 02/15/2022 23 (A) 9 - 20 Final   • Sodium 02/15/2022 136  134 - 144 mmol/L Final   • Potassium 02/15/2022 4.2  3.5 - 5.2 mmol/L Final   • Chloride 02/15/2022 96  96 - 106 mmol/L Final   • Total CO2 02/15/2022 25  20 - 29 mmol/L Final   • Calcium 02/15/2022 9.1  8.7 - 10.2 mg/dL Final   • Total Protein 02/15/2022 6.1  6.0 - 8.5 g/dL Final   • Albumin 02/15/2022 4.1  3.8 - 4.9 g/dL Final   • Globulin 02/15/2022 2.0  1.5 - 4.5 g/dL Final   • A/G Ratio 02/15/2022 2.1  1.2 - 2.2 Final   • Total Bilirubin 02/15/2022 0.5  0.0 - 1.2 mg/dL Final   • Alkaline Phosphatase 02/15/2022 60  44 - 121 IU/L Final   • AST (SGOT) 02/15/2022 22  0 - 40 IU/L Final   • ALT (SGPT) 02/15/2022 21  0 - 44 IU/L Final   • Total Cholesterol 02/15/2022 175  100 - 199 mg/dL Final   • Triglycerides 02/15/2022 43  0 - 149 mg/dL Final   • HDL Cholesterol 02/15/2022 85  >39 mg/dL Final   • VLDL Cholesterol Willem 02/15/2022 9  5 - 40 mg/dL Final   • LDL Chol Calc (NIH) 02/15/2022 81  0 - 99 mg/dL Final   • Chol/HDL Ratio 02/15/2022 2.1  0.0 - 5.0 ratio Final    Comment:                                   T. Chol/HDL Ratio                                              Men  Women                                1/2 Avg.Risk  3.4    3.3                                    Avg.Risk  5.0    4.4                                 2X Avg.Risk  9.6    7.1                                 3X Avg.Risk 23.4   11.0     • PSA 02/15/2022 1.5  0.0 - 4.0 ng/mL Final    Comment: Roche ECLIA methodology.  According to the American Urological Association, Serum PSA should  decrease and remain at  undetectable levels after radical  prostatectomy. The AUA defines biochemical recurrence as an initial  PSA value 0.2 ng/mL or greater followed by a subsequent confirmatory  PSA value 0.2 ng/mL or greater.  Values obtained with different assay methods or kits cannot be used  interchangeably. Results cannot be interpreted as absolute evidence  of the presence or absence of malignant disease.     • TSH 02/15/2022 1.090  0.450 - 4.500 uIU/mL Final   • Specific Gravity, UA 02/15/2022 1.017  1.005 - 1.030 Final   • pH, UA 02/15/2022 7.0  5.0 - 7.5 Final   • Color, UA 02/15/2022 Yellow  Yellow Final   • Appearance, UA 02/15/2022 Clear  Clear Final   • Leukocytes, UA 02/15/2022 Trace (A) Negative Final   • Protein 02/15/2022 Negative  Negative/Trace Final   • Glucose, UA 02/15/2022 Negative  Negative Final   • Ketones 02/15/2022 Negative  Negative Final   • Blood, UA 02/15/2022 Negative  Negative Final   • Bilirubin, UA 02/15/2022 Negative  Negative Final   • Urobilinogen, UA 02/15/2022 0.2  0.2 - 1.0 mg/dL Final   • Nitrite, UA 02/15/2022 Negative  Negative Final   • Microscopic Examination 02/15/2022 See below:   Final    Microscopic was indicated and was performed.   • Urinalysis Reflex 02/15/2022 Comment   Final    This specimen has reflexed to a Urine Culture.   • WBC, UA 02/15/2022 None seen  0 - 5 /hpf Final   • RBC, UA 02/15/2022 0-2  0 - 2 /hpf Final   • Epithelial Cells (non renal) 02/15/2022 None seen  0 - 10 /hpf Final   • Casts 02/15/2022 None seen  None seen /lpf Final   • Bacteria, UA 02/15/2022 None seen  None seen/Few Final   • Urine Culture 02/15/2022 Final report   Final   • Result 1 02/15/2022 Comment   Final    No growth in 36 - 48 hours.     No results found.    PHYSICAL EXAM  Debilities/Disabilities Identified: None  Emotional Behavior: Appropriate  Physical Exam alert thin white male in no active distress his heart shows a regular rate and rhythm.  He is scattered end expiratory wheezes.  His testes  are descended bilaterally without mass.  He has no left groin impulse he does have a reducible mildly tender right inguinal hernia    ASSESSMENT  Recurrent right inguinal hernia      PLAN  The risk benefits and options were discussed with the patient in detail.  Including a 7% chance of postoperative pain.  We will proceed with repair of his recurrent right inguinal hernia at [facility] with placement of On-Q pump on March 21.  Discussed risks of surgery with patient and in particular increased risk of wound infection, poor wound healing, hernias (with abdominal surgery) and post-operative pulmonary complications associated with smoking.

## 2022-03-18 ENCOUNTER — ANESTHESIA EVENT (OUTPATIENT)
Dept: PERIOP | Facility: HOSPITAL | Age: 57
End: 2022-03-18
Payer: COMMERCIAL

## 2022-03-18 ENCOUNTER — PRE-ADMISSION TESTING (OUTPATIENT)
Dept: PREADMISSION TESTING | Facility: HOSPITAL | Age: 57
End: 2022-03-18
Payer: COMMERCIAL

## 2022-03-18 VITALS
RESPIRATION RATE: 16 BRPM | HEIGHT: 64 IN | HEART RATE: 90 BPM | OXYGEN SATURATION: 97 % | SYSTOLIC BLOOD PRESSURE: 119 MMHG | WEIGHT: 144.5 LBS | BODY MASS INDEX: 24.67 KG/M2 | DIASTOLIC BLOOD PRESSURE: 80 MMHG

## 2022-03-18 DIAGNOSIS — K40.91 RECURRENT RIGHT INGUINAL HERNIA: ICD-10-CM

## 2022-03-18 LAB
ANION GAP SERPL CALCULATED.3IONS-SCNC: 8.1 MMOL/L (ref 5–15)
BUN SERPL-MCNC: 16 MG/DL (ref 6–20)
BUN/CREAT SERPL: 23.2 (ref 7–25)
CALCIUM SPEC-SCNC: 8.8 MG/DL (ref 8.6–10.5)
CHLORIDE SERPL-SCNC: 99 MMOL/L (ref 98–107)
CO2 SERPL-SCNC: 26.9 MMOL/L (ref 22–29)
CREAT SERPL-MCNC: 0.69 MG/DL (ref 0.76–1.27)
DEPRECATED RDW RBC AUTO: 45.2 FL (ref 37–54)
EGFRCR SERPLBLD CKD-EPI 2021: 108.6 ML/MIN/1.73
ERYTHROCYTE [DISTWIDTH] IN BLOOD BY AUTOMATED COUNT: 12.9 % (ref 12.3–15.4)
GLUCOSE SERPL-MCNC: 87 MG/DL (ref 65–99)
HCT VFR BLD AUTO: 43.5 % (ref 37.5–51)
HGB BLD-MCNC: 14.8 G/DL (ref 13–17.7)
MCH RBC QN AUTO: 32.4 PG (ref 26.6–33)
MCHC RBC AUTO-ENTMCNC: 34 G/DL (ref 31.5–35.7)
MCV RBC AUTO: 95.2 FL (ref 79–97)
PLATELET # BLD AUTO: 163 10*3/MM3 (ref 140–450)
PMV BLD AUTO: 9.3 FL (ref 6–12)
POTASSIUM SERPL-SCNC: 4 MMOL/L (ref 3.5–5.2)
QT INTERVAL: 359 MS
RBC # BLD AUTO: 4.57 10*6/MM3 (ref 4.14–5.8)
SARS-COV-2 RNA PNL SPEC NAA+PROBE: NOT DETECTED
SODIUM SERPL-SCNC: 134 MMOL/L (ref 136–145)
WBC NRBC COR # BLD: 6.57 10*3/MM3 (ref 3.4–10.8)

## 2022-03-18 PROCEDURE — 36415 COLL VENOUS BLD VENIPUNCTURE: CPT

## 2022-03-18 PROCEDURE — 93005 ELECTROCARDIOGRAM TRACING: CPT

## 2022-03-18 PROCEDURE — 93010 ELECTROCARDIOGRAM REPORT: CPT | Performed by: INTERNAL MEDICINE

## 2022-03-18 PROCEDURE — 80048 BASIC METABOLIC PNL TOTAL CA: CPT | Performed by: SURGERY

## 2022-03-18 PROCEDURE — 85027 COMPLETE CBC AUTOMATED: CPT | Performed by: SURGERY

## 2022-03-18 PROCEDURE — C9803 HOPD COVID-19 SPEC COLLECT: HCPCS

## 2022-03-18 PROCEDURE — 87635 SARS-COV-2 COVID-19 AMP PRB: CPT | Performed by: SURGERY

## 2022-03-18 NOTE — DISCHARGE INSTRUCTIONS
PRE-ADMISSION TESTING INSTRUCTIONS FOR ADULTS    Take these medications the morning of surgery with a small sip of water: use your nebulizer and take your pantoprazole      No aspirin, advil, aleve, ibuprofen, naproxen, diet pills, decongestants, or herbal/vitamins for a week prior to surgery.    General Instructions:    DO NOT EAT SOLID FOOD AFTER MIDNIGHT THE NIGHT BEFORE SURGERY. No gum, mints, or hard candy after midnight the night before surgery.  You may drink clear liquids the day of surgery up until 2 hours before your arrival time.  (4:30 am)  Clear liquids are liquids you can see through. Nothing RED in color.    Plain water    Sports drinks  Sodas     Gelatin (Jell-O)  Fruit juices without pulp such as white grape juice and apple juice  Popsicles that contain no fruit or yogurt  Tea or coffee (no cream or milk added)    It is beneficial for you to have a clear drink that contains carbohydrates just before you leave your house and before your fasting time begins.  We suggest a 20 ounce bottle of Gatorade or Powerade for non-diabetic patients or a 20 ounce bottle of G2 or Powerade Zero for diabetic patients.  (4:30 am)    Patients who avoid smoking, chewing tobacco and alcohol for 4 weeks prior to surgery have a reduced risk of post-operative complications.  If at all possible, quit smoking as many days before surgery as you can.    Do not smoke, use chewing tobacco or drink alcohol the day of surgery    Bring your C-PAP/ BI-PAP machine if you use one.  Wear clean comfortable clothes and socks.  Do not wear contact lenses, lotion, deodorant, or make-up.  Bring a case for your glasses if applicable. You may brush your teeth the morning of surgery.  You may wear dentures/partials, do not put adhesive/glue on them.    Leave all other jewelry and valuables at home.      Preventing a Surgical Site Infection:    Shower the night before and on the morning of surgery using the chlorhexidine soap you were given.   Use a clean washcloth with the soap.  Place clean sheets on your bed after showering the night before surgery. Do not use the CHG soap on your hair, face, or private areas. Wash your body gently for five (5) minutes. Do not scrub your skin.  Dry with a clean towel and dress in clean clothing.    Do not shave the surgical area for 10 days-2 weeks prior to surgery  because the razor can irritate skin and make it easier to develop an infection.  Make sure you, your family, and all healthcare providers clean their hands with soap and water or an alcohol based hand  before caring for you or your wound.      Day of surgery:    Your surgeon’s office will advise you of your arrival time for the day of surgery.    Upon arrival, a Pre-op nurse and Anesthesia provider will review your health history, obtain vital signs, and answer questions you may have.  The only belongings needed at this time will be your home medications and if applicable your C-PAP/BI-PAP machine.  If you are staying overnight your family can leave the rest of your belongings in the car and bring them to your room later.  A Pre-op nurse will start an IV and you may receive medication in preparation for surgery, including something to help you relax.  Your family will be able to see you in the Pre-op area.  While you are in surgery your family should notify the waiting room  if they leave the waiting room area and provide a contact phone number.    IF you have any questions, you can call the Pre-Admission Department at [phone] or your surgeon's office.  Notify your surgeon if  you become sick, have a fever, productive cough, or cannot be here the day of surgery    Please be aware that surgery does come with discomfort.  We want to make every effort to control your discomfort so please discuss any uncontrolled symptoms with your nurse.   Your doctor will most likely have prescribed pain medications.      If you are going home  after surgery, you will receive individualized written care instructions before being discharged.  A responsible adult (over the age of 18) must drive you to and from the hospital on the day of your surgery and stay with you for 24 hours after anesthesia.    If you are staying overnight following surgery, you will be transported to your hospital room following the recovery period.  [facility] has all private rooms.    You may receive a survey regarding the care you received. Your feedback is very important and will be used to collect the necessary data to help us to continue to provide excellent care.     Deductibles and co-payments are collected on the day of service. Please be prepared to pay the required co-pay, deductible or deposit on the day of service as defined by your plan.

## 2022-03-18 NOTE — PAT
Pt here for PAT visit.  Pre-op tests completed, chg soap given, and instructions reviewed.  Instructed clears until 4:30 am dos and no smoking after MN night prior, voiced understanding. Use of On Q pump reviewed and brochure given.   bed mobility training/strengthening/gait training/balance training/postural re-education/transfer training

## 2022-03-21 ENCOUNTER — HOSPITAL ENCOUNTER (OUTPATIENT)
Facility: HOSPITAL | Age: 57
Setting detail: HOSPITAL OUTPATIENT SURGERY
Discharge: HOME | End: 2022-03-21
Attending: SURGERY | Admitting: SURGERY
Payer: COMMERCIAL

## 2022-03-21 ENCOUNTER — ANESTHESIA (OUTPATIENT)
Dept: PERIOP | Facility: HOSPITAL | Age: 57
End: 2022-03-21
Payer: COMMERCIAL

## 2022-03-21 VITALS
DIASTOLIC BLOOD PRESSURE: 88 MMHG | RESPIRATION RATE: 16 BRPM | WEIGHT: 142 LBS | TEMPERATURE: 98 F | BODY MASS INDEX: 24.37 KG/M2 | OXYGEN SATURATION: 98 % | SYSTOLIC BLOOD PRESSURE: 149 MMHG | HEART RATE: 103 BPM

## 2022-03-21 DIAGNOSIS — K40.91 RECURRENT RIGHT INGUINAL HERNIA: ICD-10-CM

## 2022-03-21 PROCEDURE — C1781 MESH (IMPLANTABLE): HCPCS | Performed by: SURGERY

## 2022-03-21 PROCEDURE — 25010000002 DEXAMETHASONE PER 1 MG: Performed by: ANESTHESIOLOGY

## 2022-03-21 PROCEDURE — 25010000002 HYDRALAZINE PER 20 MG: Performed by: NURSE ANESTHETIST, CERTIFIED REGISTERED

## 2022-03-21 PROCEDURE — 49520 REREPAIR ING HERNIA REDUCE: CPT | Performed by: SURGERY

## 2022-03-21 PROCEDURE — 25010000002 PROPOFOL 10 MG/ML EMULSION: Performed by: ANESTHESIOLOGY

## 2022-03-21 PROCEDURE — 25010000002 HYDROMORPHONE 1 MG/ML SOLUTION: Performed by: ANESTHESIOLOGY

## 2022-03-21 PROCEDURE — 25010000002 KETOROLAC TROMETHAMINE PER 15 MG: Performed by: ANESTHESIOLOGY

## 2022-03-21 PROCEDURE — 25010000002 MIDAZOLAM PER 1MG: Performed by: ANESTHESIOLOGY

## 2022-03-21 PROCEDURE — 25010000002 ONDANSETRON PER 1 MG: Performed by: ANESTHESIOLOGY

## 2022-03-21 PROCEDURE — 0 CEFAZOLIN SODIUM-DEXTROSE 2-3 GM-%(50ML) RECONSTITUTED SOLUTION: Performed by: SURGERY

## 2022-03-21 PROCEDURE — 25010000002 FENTANYL CITRATE (PF) 50 MCG/ML SOLUTION: Performed by: ANESTHESIOLOGY

## 2022-03-21 DEVICE — BARD MESH PERFIX PLUG, LARGE
Type: IMPLANTABLE DEVICE | Site: INGUINAL | Status: FUNCTIONAL
Brand: BARD MESH PERFIX PLUG

## 2022-03-21 DEVICE — BARD MESH PERFIX PLUG, SMALL
Type: IMPLANTABLE DEVICE | Site: INGUINAL | Status: FUNCTIONAL
Brand: BARD MESH PERFIX PLUG

## 2022-03-21 RX ORDER — PROPOFOL 10 MG/ML
VIAL (ML) INTRAVENOUS AS NEEDED
Status: DISCONTINUED | OUTPATIENT
Start: 2022-03-21 | End: 2022-03-21 | Stop reason: SURG

## 2022-03-21 RX ORDER — SODIUM CHLORIDE, SODIUM LACTATE, POTASSIUM CHLORIDE, CALCIUM CHLORIDE 600; 310; 30; 20 MG/100ML; MG/100ML; MG/100ML; MG/100ML
9 INJECTION, SOLUTION INTRAVENOUS CONTINUOUS
Status: DISCONTINUED | OUTPATIENT
Start: 2022-03-21 | End: 2022-03-21 | Stop reason: HOSPADM

## 2022-03-21 RX ORDER — LIDOCAINE HYDROCHLORIDE 10 MG/ML
0.5 INJECTION, SOLUTION EPIDURAL; INFILTRATION; INTRACAUDAL; PERINEURAL ONCE AS NEEDED
Status: DISCONTINUED | OUTPATIENT
Start: 2022-03-21 | End: 2022-03-21 | Stop reason: HOSPADM

## 2022-03-21 RX ORDER — KETAMINE HYDROCHLORIDE 10 MG/ML
INJECTION INTRAMUSCULAR; INTRAVENOUS AS NEEDED
Status: DISCONTINUED | OUTPATIENT
Start: 2022-03-21 | End: 2022-03-21 | Stop reason: SURG

## 2022-03-21 RX ORDER — DEXAMETHASONE SODIUM PHOSPHATE 4 MG/ML
8 INJECTION, SOLUTION INTRA-ARTICULAR; INTRALESIONAL; INTRAMUSCULAR; INTRAVENOUS; SOFT TISSUE ONCE
Status: COMPLETED | OUTPATIENT
Start: 2022-03-21 | End: 2022-03-21

## 2022-03-21 RX ORDER — MIDAZOLAM HYDROCHLORIDE 2 MG/2ML
1 INJECTION, SOLUTION INTRAMUSCULAR; INTRAVENOUS
Status: COMPLETED | OUTPATIENT
Start: 2022-03-21 | End: 2022-03-21

## 2022-03-21 RX ORDER — FAMOTIDINE 10 MG/ML
20 INJECTION, SOLUTION INTRAVENOUS
Status: COMPLETED | OUTPATIENT
Start: 2022-03-21 | End: 2022-03-21

## 2022-03-21 RX ORDER — KETOROLAC TROMETHAMINE 30 MG/ML
INJECTION, SOLUTION INTRAMUSCULAR; INTRAVENOUS AS NEEDED
Status: DISCONTINUED | OUTPATIENT
Start: 2022-03-21 | End: 2022-03-21 | Stop reason: SURG

## 2022-03-21 RX ORDER — SODIUM CHLORIDE, SODIUM LACTATE, POTASSIUM CHLORIDE, CALCIUM CHLORIDE 600; 310; 30; 20 MG/100ML; MG/100ML; MG/100ML; MG/100ML
100 INJECTION, SOLUTION INTRAVENOUS CONTINUOUS
Status: DISCONTINUED | OUTPATIENT
Start: 2022-03-21 | End: 2022-03-21 | Stop reason: HOSPADM

## 2022-03-21 RX ORDER — OXYCODONE HYDROCHLORIDE AND ACETAMINOPHEN 5; 325 MG/1; MG/1
1 TABLET ORAL ONCE AS NEEDED
Status: DISCONTINUED | OUTPATIENT
Start: 2022-03-21 | End: 2022-03-21

## 2022-03-21 RX ORDER — CEFAZOLIN SODIUM 2 G/50ML
2 SOLUTION INTRAVENOUS ONCE
Status: COMPLETED | OUTPATIENT
Start: 2022-03-21 | End: 2022-03-21

## 2022-03-21 RX ORDER — SODIUM CHLORIDE 0.9 % (FLUSH) 0.9 %
10 SYRINGE (ML) INJECTION EVERY 12 HOURS SCHEDULED
Status: DISCONTINUED | OUTPATIENT
Start: 2022-03-21 | End: 2022-03-21 | Stop reason: HOSPADM

## 2022-03-21 RX ORDER — FENTANYL CITRATE 50 UG/ML
INJECTION, SOLUTION INTRAMUSCULAR; INTRAVENOUS AS NEEDED
Status: DISCONTINUED | OUTPATIENT
Start: 2022-03-21 | End: 2022-03-21 | Stop reason: SURG

## 2022-03-21 RX ORDER — ONDANSETRON 2 MG/ML
4 INJECTION INTRAMUSCULAR; INTRAVENOUS ONCE AS NEEDED
Status: DISCONTINUED | OUTPATIENT
Start: 2022-03-21 | End: 2022-03-21 | Stop reason: HOSPADM

## 2022-03-21 RX ORDER — SODIUM CHLORIDE 0.9 % (FLUSH) 0.9 %
10 SYRINGE (ML) INJECTION AS NEEDED
Status: DISCONTINUED | OUTPATIENT
Start: 2022-03-21 | End: 2022-03-21 | Stop reason: HOSPADM

## 2022-03-21 RX ORDER — ENALAPRILAT 2.5 MG/2ML
1.25 INJECTION INTRAVENOUS ONCE AS NEEDED
Status: DISCONTINUED | OUTPATIENT
Start: 2022-03-21 | End: 2022-03-21

## 2022-03-21 RX ORDER — OXYCODONE AND ACETAMINOPHEN 7.5; 325 MG/1; MG/1
2 TABLET ORAL ONCE AS NEEDED
Status: DISCONTINUED | OUTPATIENT
Start: 2022-03-21 | End: 2022-03-21

## 2022-03-21 RX ORDER — BUPIVACAINE HYDROCHLORIDE 2.5 MG/ML
INJECTION, SOLUTION INFILTRATION; PERINEURAL AS NEEDED
Status: DISCONTINUED | OUTPATIENT
Start: 2022-03-21 | End: 2022-03-21 | Stop reason: HOSPADM

## 2022-03-21 RX ORDER — HYDRALAZINE HYDROCHLORIDE 20 MG/ML
5 INJECTION INTRAMUSCULAR; INTRAVENOUS
Status: COMPLETED | OUTPATIENT
Start: 2022-03-21 | End: 2022-03-21

## 2022-03-21 RX ORDER — HYDROMORPHONE HYDROCHLORIDE 2 MG/1
2 TABLET ORAL EVERY 4 HOURS PRN
Qty: 20 TABLET | Refills: 0 | Status: SHIPPED | OUTPATIENT
Start: 2022-03-21 | End: 2022-03-28

## 2022-03-21 RX ORDER — ONDANSETRON 2 MG/ML
4 INJECTION INTRAMUSCULAR; INTRAVENOUS ONCE AS NEEDED
Status: COMPLETED | OUTPATIENT
Start: 2022-03-21 | End: 2022-03-21

## 2022-03-21 RX ORDER — SODIUM CHLORIDE 9 MG/ML
40 INJECTION, SOLUTION INTRAVENOUS AS NEEDED
Status: DISCONTINUED | OUTPATIENT
Start: 2022-03-21 | End: 2022-03-21 | Stop reason: HOSPADM

## 2022-03-21 RX ORDER — MAGNESIUM HYDROXIDE 1200 MG/15ML
LIQUID ORAL AS NEEDED
Status: DISCONTINUED | OUTPATIENT
Start: 2022-03-21 | End: 2022-03-21 | Stop reason: HOSPADM

## 2022-03-21 RX ORDER — LABETALOL HYDROCHLORIDE 5 MG/ML
5 INJECTION, SOLUTION INTRAVENOUS
Status: DISCONTINUED | OUTPATIENT
Start: 2022-03-21 | End: 2022-03-21

## 2022-03-21 RX ADMIN — ONDANSETRON 4 MG: 2 INJECTION INTRAMUSCULAR; INTRAVENOUS at 07:29

## 2022-03-21 RX ADMIN — SODIUM CHLORIDE, POTASSIUM CHLORIDE, SODIUM LACTATE AND CALCIUM CHLORIDE 9 ML/HR: 600; 310; 30; 20 INJECTION, SOLUTION INTRAVENOUS at 07:30

## 2022-03-21 RX ADMIN — HYDRALAZINE HYDROCHLORIDE 5 MG: 20 INJECTION INTRAMUSCULAR; INTRAVENOUS at 09:56

## 2022-03-21 RX ADMIN — CEFAZOLIN SODIUM 2 G: 2 SOLUTION INTRAVENOUS at 08:02

## 2022-03-21 RX ADMIN — FENTANYL CITRATE 25 MCG: 0.05 INJECTION, SOLUTION INTRAMUSCULAR; INTRAVENOUS at 08:48

## 2022-03-21 RX ADMIN — HYDROMORPHONE HYDROCHLORIDE 0.5 MG: 1 INJECTION, SOLUTION INTRAMUSCULAR; INTRAVENOUS; SUBCUTANEOUS at 10:06

## 2022-03-21 RX ADMIN — MIDAZOLAM HYDROCHLORIDE 1 MG: 1 INJECTION, SOLUTION INTRAMUSCULAR; INTRAVENOUS at 07:46

## 2022-03-21 RX ADMIN — FAMOTIDINE 20 MG: 10 INJECTION, SOLUTION INTRAVENOUS at 07:29

## 2022-03-21 RX ADMIN — KETAMINE HYDROCHLORIDE 20 MG: 10 INJECTION, SOLUTION INTRAMUSCULAR; INTRAVENOUS at 08:07

## 2022-03-21 RX ADMIN — KETOROLAC TROMETHAMINE 30 MG: 30 INJECTION, SOLUTION INTRAMUSCULAR; INTRAVENOUS at 08:50

## 2022-03-21 RX ADMIN — PROPOFOL 150 MG: 10 INJECTION, EMULSION INTRAVENOUS at 08:07

## 2022-03-21 RX ADMIN — HYDRALAZINE HYDROCHLORIDE 5 MG: 20 INJECTION INTRAMUSCULAR; INTRAVENOUS at 10:18

## 2022-03-21 RX ADMIN — DEXAMETHASONE SODIUM PHOSPHATE 8 MG: 4 INJECTION, SOLUTION INTRAMUSCULAR; INTRAVENOUS at 07:29

## 2022-03-21 RX ADMIN — FENTANYL CITRATE 25 MCG: 0.05 INJECTION, SOLUTION INTRAMUSCULAR; INTRAVENOUS at 08:05

## 2022-03-21 RX ADMIN — MIDAZOLAM HYDROCHLORIDE 1 MG: 1 INJECTION, SOLUTION INTRAMUSCULAR; INTRAVENOUS at 07:56

## 2022-03-21 RX ADMIN — FENTANYL CITRATE 50 MCG: 0.05 INJECTION, SOLUTION INTRAMUSCULAR; INTRAVENOUS at 08:38

## 2022-03-21 NOTE — ANESTHESIA PREPROCEDURE EVALUATION
Anesthesia Evaluation     Patient summary reviewed and Nursing notes reviewed   no history of anesthetic complications:  NPO Solid Status: > 8 hours  NPO Liquid Status: < 2 hours           Airway   Mallampati: I  TM distance: >3 FB  Neck ROM: full  No difficulty expected  Dental - normal exam     Pulmonary     breath sounds clear to auscultation  (+) a smoker (one pack per day) Current Smoked day of surgery, COPD moderate,   Sleep apnea: snores.  Cardiovascular - normal exam    Rhythm: regular  Rate: normal    Hypertension: denies.      Neuro/Psych  GI/Hepatic/Renal/Endo    (+)  GERD well controlled,      Musculoskeletal (-) negative ROS    Abdominal     Abdomen: soft.   Substance History   (+) alcohol use (3 to 4 beers per day),      OB/GYN negative ob/gyn ROS         Other        ROS/Med Hx Other: Black coffee 4 am                  Anesthesia Plan    ASA 2     general       Anesthetic plan, all risks, benefits, and alternatives have been provided, discussed and informed consent has been obtained with: patient and spouse/significant other.        CODE STATUS:

## 2022-03-21 NOTE — ANESTHESIA POSTPROCEDURE EVALUATION
Patient: Ace Lane Jr.    Procedure Summary     Date: 03/21/22 Room / Location:  [facility] 2 /  [facility]    Anesthesia Start: 0800 Anesthesia Stop: 0915    Procedure: INGUINAL HERNIA REPAIR (Right Abdomen) Diagnosis:       Recurrent right inguinal hernia      (Recurrent right inguinal hernia [K40.91])    Surgeons: Ace Portillo MD Provider: Danelle Dodge MD    Anesthesia Type: general ASA Status: 2          Anesthesia Type: general    Vitals  Vitals Value Taken Time   /94 03/21/22 1055   Temp 98.6 °F (37 °C) 03/21/22 1027   Pulse 112 03/21/22 1055   Resp 14 03/21/22 1055   SpO2 94 % 03/21/22 1055           Post Anesthesia Care and Evaluation    Patient location during evaluation: PHASE II  Patient participation: complete - patient participated  Level of consciousness: awake  Pain management: adequate  Airway patency: patent  Anesthetic complications: No anesthetic complications  PONV Status: none  Cardiovascular status: acceptable  Respiratory status: acceptable  Hydration status: acceptable

## 2022-03-21 NOTE — OP NOTE
Preoperative diagnosis right recurrent inguinal hernia  Postoperative diagnosis the same, pantaloon type  Procedure repair of right inguinal hernia recurrent placement of On-Q pump  Complications none  Drains none  Specimen none  Estimated blood loss 25 cc  Anesthesia General via LMA  Surgeon Dr. Portillo  Assistant none  Findings moderate size indirect inguinal hernia small direct inguinal hernia  Procedure after satisfactory induction general anesthesia via LMA the patient's right groin was prepped and draped in sterile fashion.  Transverse skin incision was made carried down through skin subcutaneous tissue.  External oblique was divided along its fibers cord was mobilized at the pubis floor the canal was explored he had a small direct hernia cord was explored and he had a moderate indirect inguinal hernia indirect inguinal hernia sac was dissected off of the vital cord structures preserving them sac was reduced back into the internal ring the defect was filled with a large Bard Marlex hernia plug which was sutured in place with use of interrupted 2-0 silk simple sutures.  Transversalis fascia was divided with cautery around the base of the direct defect down to preperitoneal fat.  Hemostasis was assured  with  electrocautery and 2-0 silk suture ligature.  The defect was filled with a small Bard Marlex hernia plug which sutured in place circumferentially using interrupted 2-0 silk simple sutures.  Floor of the canal was reconstituted with an onlay Marlex patch which was cut to size placed down overlying the pubis and the keyhole was loosely closed around the cord with interrupted 2-0 silk simple sutures.  On-Q pump tubing was placed percutaneously was placed in the floor the canal.  Cord was placed back in situ.  All counts were correct.  External oblique was closed in a running simple fashion with use of 3-0 Vicryl.  Subcutaneous tissue was closed with interrupted simple fashion with use of 3-0 Vicryl.  Skin was  closed with 4-0 Monocryl running subcuticular stitch of burying the knots.  Skin and subcutaneous tissue was locally infiltrated with quarter percent Marcaine without epinephrine.  Wound was cleaned and dried sterilely dressed 3 Steri-Strips to the On-Q pump tubing at the skin exit site OpSite to the incision second OpSite to the looped On-Q pump tubing at the skin exit site.  On-Q pump tubing was hooked to his reservoir.  Right testicle was pulled well down the hemiscrotum.  The patient tolerated procedure well was transferred to recovery room in stable condition.  When he is awake alert stable tolerating p.o. and has voided he will be discharged home to follow-up in my office next week call for problems.  Diet as tolerated.  No lifting more than 5 pounds in 6 weeks.  Ice pack to his groin for 48 hours.  Remove the On-Q pump tubing in 48 hours.  May shower at that time.  He was sent a prescription for Dilaudid 2 mg p.o. every 4 hours as needed pain 20 these were dispensed without refills.  He should call for problems call for appointment.  If he has problems voiding he should report to the emergency room

## 2022-03-21 NOTE — INTERVAL H&P NOTE
H&P updated. The patient was examined and the following changes are noted:        /96 (BP Location: Right arm, Patient Position: Lying)   Pulse 91   Temp 98.2 °F (36.8 °C) (Oral)   Resp 14   Wt 64.4 kg (142 lb)   SpO2 99%   BMI 24.37 kg/m²

## 2022-03-21 NOTE — ANESTHESIA PROCEDURE NOTES
Airway  Urgency: elective    Date/Time: 3/21/2022 8:08 AM  Airway not difficult    General Information and Staff    Patient location during procedure: OR  Anesthesiologist: Danelle Dodge MD    Indications and Patient Condition  Indications for airway management: airway protection    Preoxygenated: yes  MILS maintained throughout  Mask difficulty assessment: 1 - vent by mask    Final Airway Details  Final airway type: supraglottic airway      Successful airway: unique  Size 4    Number of attempts at approach: 1  Assessment: lips, teeth, and gum same as pre-op and atraumatic intubation

## 2022-03-28 ENCOUNTER — OFFICE VISIT (OUTPATIENT)
Dept: SURGERY | Facility: CLINIC | Age: 57
End: 2022-03-28

## 2022-03-28 DIAGNOSIS — Z09 SURGICAL FOLLOW-UP CARE: Primary | ICD-10-CM

## 2022-03-28 PROCEDURE — 99024 POSTOP FOLLOW-UP VISIT: CPT | Performed by: SURGERY

## 2022-03-28 NOTE — PROGRESS NOTES
Patient presents for 7 day po INGUINAL HERNIA REPAIR right. He states that the area is sore, swollen and bruised.  He complains of some incisional pain that is improving.  He complains of some mild bruising.  He is without other complaints.  He did not take any of the pain medication.  His incision is healing well there is no impulse.  He has mild ecchymosis.  Activity level was discussed.  I will see him back in the office in 1 month

## 2022-04-25 ENCOUNTER — OFFICE VISIT (OUTPATIENT)
Dept: SURGERY | Facility: CLINIC | Age: 57
End: 2022-04-25

## 2022-04-25 DIAGNOSIS — Z09 SURGICAL FOLLOW-UP CARE: Primary | ICD-10-CM

## 2022-04-25 PROCEDURE — 99024 POSTOP FOLLOW-UP VISIT: CPT | Performed by: SURGERY

## 2022-04-25 NOTE — PROGRESS NOTES
Patient presents for 34 day po INGUINAL HERNIA REPAIR, RT. No complaints.  He complains of mild occasional twinges in the area that is improving and does not require any medication.  His incision is healing well his right testicle is normal.  There is no impulse.  He can resume normal activities in 1 week.  I will see him back as needed

## 2023-08-08 ENCOUNTER — OFFICE VISIT (OUTPATIENT)
Dept: INTERNAL MEDICINE | Facility: CLINIC | Age: 58
End: 2023-08-08
Payer: COMMERCIAL

## 2023-08-08 VITALS
SYSTOLIC BLOOD PRESSURE: 122 MMHG | HEART RATE: 96 BPM | HEIGHT: 64 IN | WEIGHT: 151 LBS | TEMPERATURE: 98.4 F | BODY MASS INDEX: 25.78 KG/M2 | DIASTOLIC BLOOD PRESSURE: 78 MMHG | OXYGEN SATURATION: 98 %

## 2023-08-08 DIAGNOSIS — F17.200 ENCOUNTER FOR SCREENING FOR MALIGNANT NEOPLASM OF LUNG IN CURRENT SMOKER WITH 30 PACK YEAR HISTORY OR GREATER: ICD-10-CM

## 2023-08-08 DIAGNOSIS — Z12.2 ENCOUNTER FOR SCREENING FOR MALIGNANT NEOPLASM OF LUNG IN CURRENT SMOKER WITH 30 PACK YEAR HISTORY OR GREATER: ICD-10-CM

## 2023-08-08 DIAGNOSIS — R53.83 FATIGUE, UNSPECIFIED TYPE: Primary | ICD-10-CM

## 2023-08-08 PROCEDURE — 99213 OFFICE O/P EST LOW 20 MIN: CPT | Performed by: NURSE PRACTITIONER

## 2023-08-08 NOTE — PROGRESS NOTES
"Subjective   Ace Laen Jr. is a 58 y.o. male presenting today for follow up of   Chief Complaint   Patient presents with    Fatigue     C/O not feeling like himself, x 1 week         Outpatient Medications Marked as Taking for the 8/8/23 encounter (Office Visit) with Ophelia Garsia APRN   Medication Sig Dispense Refill    albuterol (PROAIR RESPICLICK) 108 (90 Base) MCG/ACT inhaler Inhale 2 puffs Every 4 (Four) Hours As Needed for Wheezing. 1 inhaler 1    ipratropium-albuterol (DUO-NEB) 0.5-2.5 mg/3 ml nebulizer Take 3 mL by nebulization Every 4 (Four) Hours As Needed for Wheezing. 90 mL 0       Presents c/o fatigue x1wk.    The following portions of the patient's history were reviewed and updated as appropriate: allergies, current medications, past family history, past medical history, past social history, past surgical history and problem list.    Review of Systems   Constitutional:  Positive for fatigue. Negative for unexpected weight loss.   HENT:  Negative for congestion, rhinorrhea and sore throat.    Respiratory:  Positive for cough (intermittently in the a.m.; chronic; at baseline). Negative for shortness of breath.    Cardiovascular:  Negative for chest pain and palpitations.   Gastrointestinal:  Positive for nausea and vomiting (one episode yesterday). Negative for abdominal pain, constipation and diarrhea.   Genitourinary:  Negative for difficulty urinating and dysuria.   Musculoskeletal:  Positive for back pain (intermittently b/t the shoulder blades for the past month or so).   Neurological:  Positive for headache (mild, dull, intermittent, chronic, baseline.).     Objective   Vitals:    08/08/23 1326   BP: 122/78   BP Location: Left arm   Patient Position: Sitting   Cuff Size: Adult   Pulse: 96   Temp: 98.4 øF (36.9 øC)   TempSrc: Infrared   SpO2: 98%   Weight: 68.5 kg (151 lb)   Height: 162.6 cm (64\")       BP Readings from Last 3 Encounters:   08/08/23 122/78   10/05/22 134/93 "   03/21/22 149/88        Wt Readings from Last 3 Encounters:   08/08/23 68.5 kg (151 lb)   10/05/22 63.5 kg (140 lb)   03/21/22 64.4 kg (142 lb)        Body mass index is 25.92 kg/mý.  Nursing notes and vitals reviewed.    Physical Exam  Constitutional:       General: He is not in acute distress.     Appearance: Normal appearance. He is well-developed.   HENT:      Head: Normocephalic.      Right Ear: Hearing, tympanic membrane, ear canal and external ear normal.      Left Ear: Hearing, tympanic membrane, ear canal and external ear normal.      Nose: Nose normal. No mucosal edema or rhinorrhea.      Mouth/Throat:      Mouth: Mucous membranes are moist.      Dentition: Abnormal dentition.      Pharynx: Oropharynx is clear. Uvula midline.   Eyes:      General: Lids are normal.      Extraocular Movements: Extraocular movements intact.      Conjunctiva/sclera: Conjunctivae normal.      Pupils: Pupils are equal, round, and reactive to light.   Neck:      Thyroid: No thyroid mass or thyromegaly.   Cardiovascular:      Rate and Rhythm: Regular rhythm.      Pulses: Normal pulses.      Heart sounds: S1 normal and S2 normal. No murmur heard.    No friction rub. No gallop.   Pulmonary:      Effort: Pulmonary effort is normal.      Breath sounds: Examination of the right-upper field reveals wheezing. Examination of the left-upper field reveals wheezing. Examination of the right-lower field reveals wheezing. Examination of the left-lower field reveals wheezing. Wheezing (mild, inspiratory) present. No rhonchi or rales.   Abdominal:      General: Abdomen is protuberant. Bowel sounds are normal.      Palpations: Abdomen is soft. There is no fluid wave, hepatomegaly, splenomegaly or mass.      Tenderness: There is no abdominal tenderness. There is no guarding or rebound.      Hernia: No hernia is present.   Musculoskeletal:         General: No deformity. Normal range of motion.      Cervical back: Normal range of motion and neck  supple.   Lymphadenopathy:      Cervical: No cervical adenopathy.   Skin:     General: Skin is warm and dry.      Findings: No lesion or rash.   Neurological:      General: No focal deficit present.      Mental Status: He is alert and oriented to person, place, and time.      Cranial Nerves: No cranial nerve deficit.      Sensory: No sensory deficit.      Motor: Motor function is intact.      Coordination: Coordination is intact.      Gait: Gait normal.      Deep Tendon Reflexes: Reflexes are normal and symmetric.   Psychiatric:         Attention and Perception: He is attentive.         Mood and Affect: Mood and affect normal.         Speech: Speech normal.         Behavior: Behavior normal.         Thought Content: Thought content normal.       No results found for this or any previous visit (from the past 672 hour(s)).      Assessment & Plan   Diagnoses and all orders for this visit:    1. Fatigue, unspecified type (Primary)  -     CBC (No Diff)  -     Comprehensive Metabolic Panel  -     Ferritin  -     Iron Profile  -     Folate  -     TSH  -     Testosterone  -     Vitamin B12  -     Vitamin D,25-Hydroxy    2. Encounter for screening for malignant neoplasm of lung in current smoker with 30 pack year history or greater  -     CT Chest Low Dose Wo; Future              Medications, including side effects, were discussed with the patient. Patient verbalized understanding.  The plan of care was discussed. All questions were answered. Patient verbalized understanding.      Return if symptoms worsen or fail to improve.

## 2023-08-09 LAB
25(OH)D3+25(OH)D2 SERPL-MCNC: 25.8 NG/ML (ref 30–100)
ALBUMIN SERPL-MCNC: 4.7 G/DL (ref 3.8–4.9)
ALBUMIN/GLOB SERPL: 2 {RATIO} (ref 1.2–2.2)
ALP SERPL-CCNC: 76 IU/L (ref 44–121)
ALT SERPL-CCNC: 20 IU/L (ref 0–44)
AST SERPL-CCNC: 22 IU/L (ref 0–40)
BILIRUB SERPL-MCNC: 0.7 MG/DL (ref 0–1.2)
BUN SERPL-MCNC: 11 MG/DL (ref 6–24)
BUN/CREAT SERPL: 14 (ref 9–20)
CALCIUM SERPL-MCNC: 9.7 MG/DL (ref 8.7–10.2)
CHLORIDE SERPL-SCNC: 97 MMOL/L (ref 96–106)
CO2 SERPL-SCNC: 26 MMOL/L (ref 20–29)
CREAT SERPL-MCNC: 0.8 MG/DL (ref 0.76–1.27)
EGFRCR SERPLBLD CKD-EPI 2021: 103 ML/MIN/1.73
ERYTHROCYTE [DISTWIDTH] IN BLOOD BY AUTOMATED COUNT: 12.8 % (ref 11.6–15.4)
FERRITIN SERPL-MCNC: 640 NG/ML (ref 30–400)
FOLATE SERPL-MCNC: >20 NG/ML
GLOBULIN SER CALC-MCNC: 2.3 G/DL (ref 1.5–4.5)
GLUCOSE SERPL-MCNC: 77 MG/DL (ref 70–99)
HCT VFR BLD AUTO: 49.3 % (ref 37.5–51)
HGB BLD-MCNC: 16.9 G/DL (ref 13–17.7)
IRON SATN MFR SERPL: 41 % (ref 15–55)
IRON SERPL-MCNC: 101 UG/DL (ref 38–169)
MCH RBC QN AUTO: 32.3 PG (ref 26.6–33)
MCHC RBC AUTO-ENTMCNC: 34.3 G/DL (ref 31.5–35.7)
MCV RBC AUTO: 94 FL (ref 79–97)
PLATELET # BLD AUTO: 172 X10E3/UL (ref 150–450)
POTASSIUM SERPL-SCNC: 4.9 MMOL/L (ref 3.5–5.2)
PROT SERPL-MCNC: 7 G/DL (ref 6–8.5)
RBC # BLD AUTO: 5.24 X10E6/UL (ref 4.14–5.8)
SODIUM SERPL-SCNC: 137 MMOL/L (ref 134–144)
TESTOST SERPL-MCNC: 395 NG/DL (ref 264–916)
TIBC SERPL-MCNC: 248 UG/DL (ref 250–450)
TSH SERPL DL<=0.005 MIU/L-ACNC: 1.25 UIU/ML (ref 0.45–4.5)
UIBC SERPL-MCNC: 147 UG/DL (ref 111–343)
VIT B12 SERPL-MCNC: 466 PG/ML (ref 232–1245)
WBC # BLD AUTO: 7.1 X10E3/UL (ref 3.4–10.8)

## 2023-08-11 DIAGNOSIS — R79.89 ELEVATED FERRITIN LEVEL: Primary | ICD-10-CM

## 2023-08-14 NOTE — PROGRESS NOTES
Pt is not taking a supplement but is having 2-3 beers per night. He plans to hold alcohol and will call back to schedule lab recheck.

## 2023-08-21 ENCOUNTER — HOSPITAL ENCOUNTER (OUTPATIENT)
Dept: CT IMAGING | Facility: HOSPITAL | Age: 58
Discharge: HOME | End: 2023-08-21
Admitting: NURSE PRACTITIONER
Payer: COMMERCIAL

## 2023-08-21 DIAGNOSIS — Z12.2 ENCOUNTER FOR SCREENING FOR MALIGNANT NEOPLASM OF LUNG IN CURRENT SMOKER WITH 30 PACK YEAR HISTORY OR GREATER: ICD-10-CM

## 2023-08-21 DIAGNOSIS — F17.200 ENCOUNTER FOR SCREENING FOR MALIGNANT NEOPLASM OF LUNG IN CURRENT SMOKER WITH 30 PACK YEAR HISTORY OR GREATER: ICD-10-CM

## 2023-08-21 PROCEDURE — 71271 CT THORAX LUNG CANCER SCR C-: CPT

## 2023-08-30 RX ORDER — PANTOPRAZOLE SODIUM 40 MG/1
TABLET, DELAYED RELEASE ORAL
Qty: 90 TABLET | Refills: 0 | Status: SHIPPED | OUTPATIENT
Start: 2023-08-30

## 2024-06-04 ENCOUNTER — TELEPHONE (OUTPATIENT)
Dept: INTERNAL MEDICINE | Facility: CLINIC | Age: 59
End: 2024-06-04
Payer: COMMERCIAL

## 2024-06-04 DIAGNOSIS — Z12.5 ENCOUNTER FOR SCREENING FOR MALIGNANT NEOPLASM OF PROSTATE: ICD-10-CM

## 2024-06-04 DIAGNOSIS — Z00.00 ROUTINE HEALTH MAINTENANCE: Primary | ICD-10-CM

## 2024-06-04 NOTE — TELEPHONE ENCOUNTER
OK FOR HUB TO READ.  LVM for patient that PCP needs blood work before appt in July. Went ahead and scheduled appt. Just need him to verify date and time is good. If not we can reschedule.

## 2024-06-05 DIAGNOSIS — Z12.5 ENCOUNTER FOR SCREENING FOR MALIGNANT NEOPLASM OF PROSTATE: ICD-10-CM

## 2024-06-05 DIAGNOSIS — Z00.00 ROUTINE HEALTH MAINTENANCE: ICD-10-CM

## 2024-06-25 LAB
ALBUMIN SERPL-MCNC: 4.3 G/DL (ref 3.8–4.9)
ALP SERPL-CCNC: 60 IU/L (ref 44–121)
ALT SERPL-CCNC: 17 IU/L (ref 0–44)
AST SERPL-CCNC: 18 IU/L (ref 0–40)
BASOPHILS # BLD AUTO: 0 X10E3/UL (ref 0–0.2)
BASOPHILS NFR BLD AUTO: 1 %
BILIRUB SERPL-MCNC: 0.5 MG/DL (ref 0–1.2)
BUN SERPL-MCNC: 13 MG/DL (ref 6–24)
BUN/CREAT SERPL: 18 (ref 9–20)
CALCIUM SERPL-MCNC: 9.3 MG/DL (ref 8.7–10.2)
CHLORIDE SERPL-SCNC: 97 MMOL/L (ref 96–106)
CHOLEST SERPL-MCNC: 198 MG/DL (ref 100–199)
CHOLEST/HDLC SERPL: 2.4 RATIO (ref 0–5)
CO2 SERPL-SCNC: 27 MMOL/L (ref 20–29)
CREAT SERPL-MCNC: 0.74 MG/DL (ref 0.76–1.27)
EGFRCR SERPLBLD CKD-EPI 2021: 105 ML/MIN/1.73
EOSINOPHIL # BLD AUTO: 0.2 X10E3/UL (ref 0–0.4)
EOSINOPHIL NFR BLD AUTO: 3 %
ERYTHROCYTE [DISTWIDTH] IN BLOOD BY AUTOMATED COUNT: 12.7 % (ref 11.6–15.4)
GLOBULIN SER CALC-MCNC: 2.1 G/DL (ref 1.5–4.5)
GLUCOSE SERPL-MCNC: 102 MG/DL (ref 70–99)
HCT VFR BLD AUTO: 45.7 % (ref 37.5–51)
HDLC SERPL-MCNC: 84 MG/DL
HGB BLD-MCNC: 15.2 G/DL (ref 13–17.7)
IMM GRANULOCYTES # BLD AUTO: 0 X10E3/UL (ref 0–0.1)
IMM GRANULOCYTES NFR BLD AUTO: 1 %
LDLC SERPL CALC-MCNC: 103 MG/DL (ref 0–99)
LYMPHOCYTES # BLD AUTO: 2 X10E3/UL (ref 0.7–3.1)
LYMPHOCYTES NFR BLD AUTO: 32 %
MCH RBC QN AUTO: 31.5 PG (ref 26.6–33)
MCHC RBC AUTO-ENTMCNC: 33.3 G/DL (ref 31.5–35.7)
MCV RBC AUTO: 95 FL (ref 79–97)
MONOCYTES # BLD AUTO: 0.6 X10E3/UL (ref 0.1–0.9)
MONOCYTES NFR BLD AUTO: 9 %
NEUTROPHILS # BLD AUTO: 3.5 X10E3/UL (ref 1.4–7)
NEUTROPHILS NFR BLD AUTO: 54 %
PLATELET # BLD AUTO: 183 X10E3/UL (ref 150–450)
POTASSIUM SERPL-SCNC: 4.7 MMOL/L (ref 3.5–5.2)
PROT SERPL-MCNC: 6.4 G/DL (ref 6–8.5)
PSA SERPL-MCNC: 1.1 NG/ML (ref 0–4)
RBC # BLD AUTO: 4.82 X10E6/UL (ref 4.14–5.8)
SODIUM SERPL-SCNC: 134 MMOL/L (ref 134–144)
TRIGL SERPL-MCNC: 60 MG/DL (ref 0–149)
TSH SERPL DL<=0.005 MIU/L-ACNC: 1.25 UIU/ML (ref 0.45–4.5)
VLDLC SERPL CALC-MCNC: 11 MG/DL (ref 5–40)
WBC # BLD AUTO: 6.3 X10E3/UL (ref 3.4–10.8)

## 2024-07-18 ENCOUNTER — OFFICE VISIT (OUTPATIENT)
Dept: INTERNAL MEDICINE | Facility: CLINIC | Age: 59
End: 2024-07-18
Payer: COMMERCIAL

## 2024-07-18 VITALS
HEIGHT: 64 IN | SYSTOLIC BLOOD PRESSURE: 150 MMHG | DIASTOLIC BLOOD PRESSURE: 90 MMHG | BODY MASS INDEX: 25.92 KG/M2 | HEART RATE: 82 BPM | OXYGEN SATURATION: 99 % | WEIGHT: 151.8 LBS | TEMPERATURE: 98 F

## 2024-07-18 DIAGNOSIS — Z72.0 TOBACCO ABUSE DISORDER: ICD-10-CM

## 2024-07-18 DIAGNOSIS — Z12.2 ENCOUNTER FOR SCREENING FOR MALIGNANT NEOPLASM OF LUNG IN CURRENT SMOKER WITH 30 PACK YEAR HISTORY OR GREATER: ICD-10-CM

## 2024-07-18 DIAGNOSIS — R06.83 SNORING: ICD-10-CM

## 2024-07-18 DIAGNOSIS — G47.8 NON-RESTORATIVE SLEEP: ICD-10-CM

## 2024-07-18 DIAGNOSIS — R53.82 CHRONIC FATIGUE: ICD-10-CM

## 2024-07-18 DIAGNOSIS — F17.200 ENCOUNTER FOR SCREENING FOR MALIGNANT NEOPLASM OF LUNG IN CURRENT SMOKER WITH 30 PACK YEAR HISTORY OR GREATER: ICD-10-CM

## 2024-07-18 DIAGNOSIS — Z23 NEED FOR TDAP VACCINATION: ICD-10-CM

## 2024-07-18 DIAGNOSIS — R40.0 DAYTIME SOMNOLENCE: ICD-10-CM

## 2024-07-18 DIAGNOSIS — I10 PRIMARY HYPERTENSION: ICD-10-CM

## 2024-07-18 DIAGNOSIS — Z00.00 ENCOUNTER FOR WELLNESS EXAMINATION IN ADULT: Primary | ICD-10-CM

## 2024-07-18 PROCEDURE — 90471 IMMUNIZATION ADMIN: CPT | Performed by: NURSE PRACTITIONER

## 2024-07-18 PROCEDURE — 90715 TDAP VACCINE 7 YRS/> IM: CPT | Performed by: NURSE PRACTITIONER

## 2024-07-18 PROCEDURE — 99396 PREV VISIT EST AGE 40-64: CPT | Performed by: NURSE PRACTITIONER

## 2024-07-18 RX ORDER — LOSARTAN POTASSIUM 25 MG/1
25 TABLET ORAL DAILY
Qty: 90 TABLET | Refills: 0 | Status: SHIPPED | OUTPATIENT
Start: 2024-07-18

## 2024-07-18 NOTE — PROGRESS NOTES
SUSAN Bello is a 59 y.o. male presenting for Annual Exam    His current/chronic health conditions include:  Patient Active Problem List   Diagnosis    Right upper quadrant abdominal pain    Weight loss    Nausea    Colon cancer screening    Hx of colonic polyps    COPD (chronic obstructive pulmonary disease)    Recurrent right inguinal hernia       Outpatient Medications Marked as Taking for the 7/18/24 encounter (Office Visit) with Ophelia Garsia APRN   Medication Sig Dispense Refill    Cetirizine HCl (Allergy Relief) 10 MG capsule Take 1 capsule by mouth As Needed (Seasonal allergies).          Screenings:  PSA: 1.1  Colon Cancer: CLS w/ numerous adenomatous polyps, 3yr recall, due 02/2025  Tob use: current; has decreased to less than 1PPD      He has chronic fatigue. This is persistent but unchanged over the past year. He does not have the opportunity to nap on weekdays. He does nap on weekends in the afternoon. Typically rises around 0300. Work starts at 0600 and lasts until 1630. Retires around 1900. He wakes frequently overnight and struggles to return to sleep. Sleep is non-restorative. He snores.      The patient's allergies, current medications, problem list, past medical history, past family history, and past social history were reviewed and updated as appropriate.    Review of Systems   Constitutional: Negative.    HENT: Negative.     Eyes: Negative.    Respiratory: Negative.  Negative for cough, shortness of breath and wheezing.    Cardiovascular: Negative.    Gastrointestinal: Negative.    Endocrine: Negative.    Genitourinary: Negative.    Musculoskeletal: Negative.    Skin: Negative.    Allergic/Immunologic: Negative.    Neurological: Negative.    Hematological: Negative.    Psychiatric/Behavioral: Negative.         OBJECTIVE    Vitals:    07/18/24 1506 07/18/24 1542   BP: 158/98 150/90   BP Location: Left arm    Patient Position: Sitting    Cuff Size: Adult    Pulse: 82    Temp: 98  "°F (36.7 °C)    TempSrc: Infrared    SpO2: 99%    Weight: 68.9 kg (151 lb 12.8 oz)    Height: 162.6 cm (64\")        BP Readings from Last 3 Encounters:   07/18/24 150/90   08/08/23 122/78   10/05/22 134/93       Wt Readings from Last 3 Encounters:   07/18/24 68.9 kg (151 lb 12.8 oz)   08/08/23 68.5 kg (151 lb)   10/05/22 63.5 kg (140 lb)       Body mass index is 26.06 kg/m².  Nursing notes and vital signs reviewed.    Physical Exam  Constitutional:       General: He is not in acute distress.     Appearance: Normal appearance. He is well-developed.   HENT:      Head: Normocephalic.      Right Ear: Hearing, tympanic membrane, ear canal and external ear normal.      Left Ear: Hearing, tympanic membrane, ear canal and external ear normal.      Nose: Nose normal. No mucosal edema or rhinorrhea.      Mouth/Throat:      Mouth: Mucous membranes are moist.      Pharynx: Oropharynx is clear. Uvula midline.   Eyes:      General: Lids are normal.      Extraocular Movements: Extraocular movements intact.      Conjunctiva/sclera: Conjunctivae normal.      Pupils: Pupils are equal, round, and reactive to light.   Neck:      Thyroid: No thyroid mass or thyromegaly.   Cardiovascular:      Rate and Rhythm: Regular rhythm.      Pulses: Normal pulses.      Heart sounds: S1 normal and S2 normal. No murmur heard.     No friction rub. No gallop.   Pulmonary:      Effort: Pulmonary effort is normal.      Breath sounds: Normal breath sounds. No wheezing, rhonchi or rales.   Abdominal:      General: Bowel sounds are normal.      Palpations: Abdomen is soft.      Tenderness: There is no abdominal tenderness. There is no guarding.      Hernia: No hernia is present.   Musculoskeletal:         General: No deformity. Normal range of motion.      Cervical back: Normal range of motion and neck supple.   Lymphadenopathy:      Cervical: No cervical adenopathy.   Skin:     General: Skin is warm and dry.      Findings: No lesion or rash. "   Neurological:      General: No focal deficit present.      Mental Status: He is alert and oriented to person, place, and time.      Cranial Nerves: No cranial nerve deficit.      Sensory: No sensory deficit.      Motor: Motor function is intact.      Coordination: Coordination is intact.      Gait: Gait normal.      Deep Tendon Reflexes: Reflexes are normal and symmetric.   Psychiatric:         Attention and Perception: He is attentive.         Mood and Affect: Mood and affect normal.         Speech: Speech normal.         Behavior: Behavior normal.         Thought Content: Thought content normal.           Recent Results (from the past 672 hour(s))   PSA Screen    Collection Time: 06/24/24  8:27 AM    Specimen: Blood   Result Value Ref Range    PSA 1.1 0.0 - 4.0 ng/mL   TSH    Collection Time: 06/24/24  8:27 AM    Specimen: Blood   Result Value Ref Range    TSH 1.250 0.450 - 4.500 uIU/mL   Lipid Panel With / Chol / HDL Ratio    Collection Time: 06/24/24  8:27 AM    Specimen: Blood   Result Value Ref Range    Total Cholesterol 198 100 - 199 mg/dL    Triglycerides 60 0 - 149 mg/dL    HDL Cholesterol 84 >39 mg/dL    VLDL Cholesterol Willem 11 5 - 40 mg/dL    LDL Chol Calc (NIH) 103 (H) 0 - 99 mg/dL    Chol/HDL Ratio 2.4 0.0 - 5.0 ratio   Comprehensive Metabolic Panel    Collection Time: 06/24/24  8:27 AM    Specimen: Blood   Result Value Ref Range    Glucose 102 (H) 70 - 99 mg/dL    BUN 13 6 - 24 mg/dL    Creatinine 0.74 (L) 0.76 - 1.27 mg/dL    EGFR Result 105 >59 mL/min/1.73    BUN/Creatinine Ratio 18 9 - 20    Sodium 134 134 - 144 mmol/L    Potassium 4.7 3.5 - 5.2 mmol/L    Chloride 97 96 - 106 mmol/L    Total CO2 27 20 - 29 mmol/L    Calcium 9.3 8.7 - 10.2 mg/dL    Total Protein 6.4 6.0 - 8.5 g/dL    Albumin 4.3 3.8 - 4.9 g/dL    Globulin 2.1 1.5 - 4.5 g/dL    Total Bilirubin 0.5 0.0 - 1.2 mg/dL    Alkaline Phosphatase 60 44 - 121 IU/L    AST (SGOT) 18 0 - 40 IU/L    ALT (SGPT) 17 0 - 44 IU/L   CBC & Differential     Collection Time: 06/24/24  8:27 AM    Specimen: Blood   Result Value Ref Range    WBC 6.3 3.4 - 10.8 x10E3/uL    RBC 4.82 4.14 - 5.80 x10E6/uL    Hemoglobin 15.2 13.0 - 17.7 g/dL    Hematocrit 45.7 37.5 - 51.0 %    MCV 95 79 - 97 fL    MCH 31.5 26.6 - 33.0 pg    MCHC 33.3 31.5 - 35.7 g/dL    RDW 12.7 11.6 - 15.4 %    Platelets 183 150 - 450 x10E3/uL    Neutrophil Rel % 54 Not Estab. %    Lymphocyte Rel % 32 Not Estab. %    Monocyte Rel % 9 Not Estab. %    Eosinophil Rel % 3 Not Estab. %    Basophil Rel % 1 Not Estab. %    Neutrophils Absolute 3.5 1.4 - 7.0 x10E3/uL    Lymphocytes Absolute 2.0 0.7 - 3.1 x10E3/uL    Monocytes Absolute 0.6 0.1 - 0.9 x10E3/uL    Eosinophils Absolute 0.2 0.0 - 0.4 x10E3/uL    Basophils Absolute 0.0 0.0 - 0.2 x10E3/uL    Immature Granulocyte Rel % 1 Not Estab. %    Immature Grans Absolute 0.0 0.0 - 0.1 x10E3/uL         ASSESSMENT AND PLAN    Diagnoses and all orders for this visit:    1. Encounter for wellness examination in adult (Primary)    2. Encounter for screening for malignant neoplasm of lung in current smoker with 30 pack year history or greater  -      CT Chest Low Dose Cancer Screening WO; Future    3. Need for Tdap vaccination  -     Tdap Vaccine Greater Than or Equal To 6yo IM    4. Chronic fatigue  -     Ambulatory Referral to Sleep Medicine    5. Daytime somnolence  -     Ambulatory Referral to Sleep Medicine    6. Non-restorative sleep  -     Ambulatory Referral to Sleep Medicine    7. Snoring  -     Ambulatory Referral to Sleep Medicine    8. Primary hypertension  -     losartan (Cozaar) 25 MG tablet; Take 1 tablet by mouth Daily.  Dispense: 90 tablet; Refill: 0    9. Tobacco abuse disorder          1, 2, 3. Preventative counseling completed including relevant screenings, appropriate vaccinations, healthy nutrition, and appropriate physical activity. Age-appropriate Screening & Interventions recommended by USPSTF were reviewed with the patient, and Health Maintenance  was updated in Epic.  BMI is >= 25 and <30. (Overweight) The following options were offered after discussion;: exercise counseling/recommendations and nutrition counseling/recommendations      4, 5, 6, 7. Refer for Sleep Medicine consult.    8. Stop smoking.  Patient counseled regarding therapeutic lifestyle changes including improved nutrition, increased exercise, and weight loss.  Reduce caffeine.  Start Losartan.    9. Counseled to quit.      Medications, including side effects, were discussed with the patient. Patient verbalized understanding.  The plan of care was discussed. All questions were answered. Patient verbalized understanding.        Return in about 3 months (around 10/18/2024).

## (undated) DEVICE — GOWN ISOL W/THUMB UNIV BLU BX/15

## (undated) DEVICE — APPL CHLORAPREP HI/LITE 26ML ORNG

## (undated) DEVICE — FLEX ADVANTAGE 1500CC: Brand: FLEX ADVANTAGE

## (undated) DEVICE — GOWN ,SIRUS,NONREINFORCED 3XL: Brand: MEDLINE

## (undated) DEVICE — KT ORCA ORCAPOD DISP STRL

## (undated) DEVICE — LAG MINOR PROCEDURE: Brand: MEDLINE INDUSTRIES, INC.

## (undated) DEVICE — Device: Brand: CAUTERY TIP CLEANER

## (undated) DEVICE — SYR LUER SLPTP 50ML

## (undated) DEVICE — SUT VIC 3/0 CTI 36IN J944H

## (undated) DEVICE — SINGLE-USE POLYPECTOMY SNARE: Brand: SENSATION SHORT THROW

## (undated) DEVICE — DRSNG SURESITE WNDW 2.38X2.75

## (undated) DEVICE — SINGLE-USE BIOPSY FORCEPS: Brand: RADIAL JAW 4

## (undated) DEVICE — PATIENT RETURN ELECTRODE, SINGLE-USE, CONTACT QUALITY MONITORING, ADULT, WITH 9FT CORD, FOR PATIENTS WEIGING OVER 33LBS. (15KG): Brand: MEGADYNE

## (undated) DEVICE — GLV SURG SIGNATURE ESSENTIAL PF LTX SZ8

## (undated) DEVICE — DRN PENRS SIL 1/4X18IN LF STRL

## (undated) DEVICE — BLD CLIP UNIV SURG GRY

## (undated) DEVICE — PENCL ES MEGADINE EZ/CLEAN BUTN W/HOLSTR 10FT

## (undated) DEVICE — TUBING, SUCTION, 1/4" X 12', STRAIGHT: Brand: MEDLINE

## (undated) DEVICE — MSK ENDO PORT O2 POM ELITE CURAPLEX A/

## (undated) DEVICE — VIAL FORMLN CAP 10PCT 20ML

## (undated) DEVICE — MEDI-VAC YANK SUCT HNDL W/TPRD BULBOUS TIP: Brand: CARDINAL HEALTH

## (undated) DEVICE — KT PUMP PAIN 2D 2.5IN SGL ANTIB 100ML

## (undated) DEVICE — TRAP POLYP 4CHAMBER

## (undated) DEVICE — LAB CORP RAPD UREA

## (undated) DEVICE — DECANTER: Brand: UNBRANDED

## (undated) DEVICE — CANN NASL CO2 TRULINK W/O2 A/

## (undated) DEVICE — BITEBLOCK OMNI BLOC

## (undated) DEVICE — SUT MNCRYL 4/0 PS2 18 IN

## (undated) DEVICE — Device

## (undated) DEVICE — SUT SILK 2/0 SH 30IN K833H

## (undated) DEVICE — TRAP FLD MINIVAC MEGADYNE 100ML

## (undated) DEVICE — SAFELINER SUCTION CANISTER 1000CC: Brand: DEROYAL

## (undated) DEVICE — ELECTRD BLD MEGADYNE 2.75IN SS